# Patient Record
Sex: FEMALE | Race: OTHER | NOT HISPANIC OR LATINO
[De-identification: names, ages, dates, MRNs, and addresses within clinical notes are randomized per-mention and may not be internally consistent; named-entity substitution may affect disease eponyms.]

---

## 2017-07-06 ENCOUNTER — MESSAGE (OUTPATIENT)
Age: 82
End: 2017-07-06

## 2017-07-31 ENCOUNTER — APPOINTMENT (OUTPATIENT)
Dept: NEUROLOGY | Facility: CLINIC | Age: 82
End: 2017-07-31

## 2017-08-28 ENCOUNTER — APPOINTMENT (OUTPATIENT)
Dept: GERIATRICS | Facility: CLINIC | Age: 82
End: 2017-08-28
Payer: MEDICARE

## 2017-08-28 VITALS
OXYGEN SATURATION: 98 % | HEART RATE: 78 BPM | WEIGHT: 118 LBS | HEIGHT: 62.4 IN | RESPIRATION RATE: 20 BRPM | BODY MASS INDEX: 21.44 KG/M2 | SYSTOLIC BLOOD PRESSURE: 140 MMHG | DIASTOLIC BLOOD PRESSURE: 70 MMHG

## 2017-08-28 DIAGNOSIS — J45.909 UNSPECIFIED ASTHMA, UNCOMPLICATED: ICD-10-CM

## 2017-08-28 DIAGNOSIS — K73.9 CHRONIC HEPATITIS, UNSPECIFIED: ICD-10-CM

## 2017-08-28 DIAGNOSIS — K57.32 DIVERTICULITIS OF LARGE INTESTINE W/OUT PERFORATION OR ABSCESS W/OUT BLEEDING: ICD-10-CM

## 2017-08-28 DIAGNOSIS — I10 ESSENTIAL (PRIMARY) HYPERTENSION: ICD-10-CM

## 2017-08-28 DIAGNOSIS — Z00.00 ENCOUNTER FOR GENERAL ADULT MEDICAL EXAMINATION W/OUT ABNORMAL FINDINGS: ICD-10-CM

## 2017-08-28 DIAGNOSIS — E03.9 HYPOTHYROIDISM, UNSPECIFIED: ICD-10-CM

## 2017-08-28 DIAGNOSIS — D12.6 BENIGN NEOPLASM OF COLON, UNSPECIFIED: ICD-10-CM

## 2017-08-28 DIAGNOSIS — E11.9 TYPE 2 DIABETES MELLITUS W/OUT COMPLICATIONS: ICD-10-CM

## 2017-08-28 DIAGNOSIS — E78.5 HYPERLIPIDEMIA, UNSPECIFIED: ICD-10-CM

## 2017-08-28 DIAGNOSIS — R41.3 OTHER AMNESIA: ICD-10-CM

## 2017-08-28 PROCEDURE — 99205 OFFICE O/P NEW HI 60 MIN: CPT | Mod: GC

## 2017-08-29 LAB
ALBUMIN SERPL ELPH-MCNC: 4 G/DL
ALP BLD-CCNC: 189 U/L
ALT SERPL-CCNC: 17 U/L
ANION GAP SERPL CALC-SCNC: 15 MMOL/L
AST SERPL-CCNC: 28 U/L
BASOPHILS # BLD AUTO: 0.04 K/UL
BASOPHILS NFR BLD AUTO: 0.9 %
BILIRUB SERPL-MCNC: 0.4 MG/DL
BUN SERPL-MCNC: 20 MG/DL
CALCIUM SERPL-MCNC: 9.1 MG/DL
CHLORIDE SERPL-SCNC: 104 MMOL/L
CHOLEST SERPL-MCNC: 272 MG/DL
CHOLEST/HDLC SERPL: 3 RATIO
CO2 SERPL-SCNC: 23 MMOL/L
CREAT SERPL-MCNC: 1.01 MG/DL
EOSINOPHIL # BLD AUTO: 0.03 K/UL
EOSINOPHIL NFR BLD AUTO: 0.7 %
GLUCOSE SERPL-MCNC: 162 MG/DL
HBA1C MFR BLD HPLC: 5.8 %
HCT VFR BLD CALC: 39.2 %
HDLC SERPL-MCNC: 91 MG/DL
HGB BLD-MCNC: 12.5 G/DL
IMM GRANULOCYTES NFR BLD AUTO: 0 %
LDLC SERPL CALC-MCNC: 165 MG/DL
LYMPHOCYTES # BLD AUTO: 1.45 K/UL
LYMPHOCYTES NFR BLD AUTO: 33.2 %
MAN DIFF?: NORMAL
MCHC RBC-ENTMCNC: 28.9 PG
MCHC RBC-ENTMCNC: 31.9 GM/DL
MCV RBC AUTO: 90.5 FL
MONOCYTES # BLD AUTO: 0.54 K/UL
MONOCYTES NFR BLD AUTO: 12.4 %
NEUTROPHILS # BLD AUTO: 2.31 K/UL
NEUTROPHILS NFR BLD AUTO: 52.8 %
PLATELET # BLD AUTO: 180 K/UL
POTASSIUM SERPL-SCNC: 4.2 MMOL/L
PROT SERPL-MCNC: 7.3 G/DL
RBC # BLD: 4.33 M/UL
RBC # FLD: 13.1 %
SODIUM SERPL-SCNC: 142 MMOL/L
TRIGL SERPL-MCNC: 78 MG/DL
TSH SERPL-ACNC: 5.86 UIU/ML
WBC # FLD AUTO: 4.37 K/UL

## 2017-08-31 ENCOUNTER — INPATIENT (INPATIENT)
Facility: HOSPITAL | Age: 82
LOS: 4 days | DRG: 283 | End: 2017-09-05
Attending: HOSPITALIST | Admitting: HOSPITALIST
Payer: MEDICARE

## 2017-08-31 VITALS
DIASTOLIC BLOOD PRESSURE: 113 MMHG | SYSTOLIC BLOOD PRESSURE: 134 MMHG | RESPIRATION RATE: 20 BRPM | OXYGEN SATURATION: 95 % | HEART RATE: 112 BPM

## 2017-08-31 DIAGNOSIS — E11.9 TYPE 2 DIABETES MELLITUS WITHOUT COMPLICATIONS: ICD-10-CM

## 2017-08-31 DIAGNOSIS — I21.3 ST ELEVATION (STEMI) MYOCARDIAL INFARCTION OF UNSPECIFIED SITE: ICD-10-CM

## 2017-08-31 DIAGNOSIS — Z90.710 ACQUIRED ABSENCE OF BOTH CERVIX AND UTERUS: Chronic | ICD-10-CM

## 2017-08-31 DIAGNOSIS — Z29.9 ENCOUNTER FOR PROPHYLACTIC MEASURES, UNSPECIFIED: ICD-10-CM

## 2017-08-31 DIAGNOSIS — Z90.89 ACQUIRED ABSENCE OF OTHER ORGANS: Chronic | ICD-10-CM

## 2017-08-31 DIAGNOSIS — I10 ESSENTIAL (PRIMARY) HYPERTENSION: ICD-10-CM

## 2017-08-31 DIAGNOSIS — E03.9 HYPOTHYROIDISM, UNSPECIFIED: ICD-10-CM

## 2017-08-31 LAB
ALBUMIN SERPL ELPH-MCNC: 3.8 G/DL — SIGNIFICANT CHANGE UP (ref 3.3–5)
ALBUMIN SERPL ELPH-MCNC: 4.2 G/DL — SIGNIFICANT CHANGE UP (ref 3.3–5)
ALP SERPL-CCNC: 181 U/L — HIGH (ref 40–120)
ALP SERPL-CCNC: 189 U/L — HIGH (ref 40–120)
ALT FLD-CCNC: 31 U/L RC — SIGNIFICANT CHANGE UP (ref 10–45)
ALT FLD-CCNC: 31 U/L RC — SIGNIFICANT CHANGE UP (ref 10–45)
ANION GAP SERPL CALC-SCNC: 16 MMOL/L — SIGNIFICANT CHANGE UP (ref 5–17)
ANION GAP SERPL CALC-SCNC: 16 MMOL/L — SIGNIFICANT CHANGE UP (ref 5–17)
APTT BLD: 28.5 SEC — SIGNIFICANT CHANGE UP (ref 27.5–37.4)
APTT BLD: 48.8 SEC — HIGH (ref 27.5–37.4)
APTT BLD: 84 SEC — HIGH (ref 27.5–37.4)
AST SERPL-CCNC: 149 U/L — HIGH (ref 10–40)
AST SERPL-CCNC: 67 U/L — HIGH (ref 10–40)
BASE EXCESS BLDV CALC-SCNC: 3.1 MMOL/L — HIGH (ref -2–2)
BASOPHILS # BLD AUTO: 0 K/UL — SIGNIFICANT CHANGE UP (ref 0–0.2)
BASOPHILS # BLD AUTO: 0.1 K/UL — SIGNIFICANT CHANGE UP (ref 0–0.2)
BASOPHILS NFR BLD AUTO: 0.2 % — SIGNIFICANT CHANGE UP (ref 0–2)
BASOPHILS NFR BLD AUTO: 0.7 % — SIGNIFICANT CHANGE UP (ref 0–2)
BILIRUB SERPL-MCNC: 0.8 MG/DL — SIGNIFICANT CHANGE UP (ref 0.2–1.2)
BILIRUB SERPL-MCNC: 1 MG/DL — SIGNIFICANT CHANGE UP (ref 0.2–1.2)
BLD GP AB SCN SERPL QL: NEGATIVE — SIGNIFICANT CHANGE UP
BUN SERPL-MCNC: 19 MG/DL — SIGNIFICANT CHANGE UP (ref 7–23)
BUN SERPL-MCNC: 22 MG/DL — SIGNIFICANT CHANGE UP (ref 7–23)
CA-I SERPL-SCNC: 1.17 MMOL/L — SIGNIFICANT CHANGE UP (ref 1.12–1.3)
CALCIUM SERPL-MCNC: 9.2 MG/DL — SIGNIFICANT CHANGE UP (ref 8.4–10.5)
CALCIUM SERPL-MCNC: 9.5 MG/DL — SIGNIFICANT CHANGE UP (ref 8.4–10.5)
CHLORIDE BLDV-SCNC: 100 MMOL/L — SIGNIFICANT CHANGE UP (ref 96–108)
CHLORIDE SERPL-SCNC: 101 MMOL/L — SIGNIFICANT CHANGE UP (ref 96–108)
CHLORIDE SERPL-SCNC: 97 MMOL/L — SIGNIFICANT CHANGE UP (ref 96–108)
CHOLEST SERPL-MCNC: 227 MG/DL — HIGH (ref 10–199)
CK MB BLD-MCNC: 13 % — HIGH (ref 0–3.5)
CK MB BLD-MCNC: 4.8 % — HIGH (ref 0–3.5)
CK MB BLD-MCNC: 8.3 % — HIGH (ref 0–3.5)
CK MB CFR SERPL CALC: 132.9 NG/ML — HIGH (ref 0–3.8)
CK MB CFR SERPL CALC: 273.2 NG/ML — HIGH (ref 0–3.8)
CK MB CFR SERPL CALC: 279.1 NG/ML — HIGH (ref 0–3.8)
CK MB CFR SERPL CALC: 35.9 NG/ML — HIGH (ref 0–3.8)
CK SERPL-CCNC: 2103 U/L — HIGH (ref 25–170)
CK SERPL-CCNC: 2788 U/L — HIGH (ref 25–170)
CK SERPL-CCNC: 432 U/L — HIGH (ref 25–170)
CO2 BLDV-SCNC: 31 MMOL/L — HIGH (ref 22–30)
CO2 SERPL-SCNC: 23 MMOL/L — SIGNIFICANT CHANGE UP (ref 22–31)
CO2 SERPL-SCNC: 27 MMOL/L — SIGNIFICANT CHANGE UP (ref 22–31)
CREAT SERPL-MCNC: 0.87 MG/DL — SIGNIFICANT CHANGE UP (ref 0.5–1.3)
CREAT SERPL-MCNC: 1.06 MG/DL — SIGNIFICANT CHANGE UP (ref 0.5–1.3)
EOSINOPHIL # BLD AUTO: 0 K/UL — SIGNIFICANT CHANGE UP (ref 0–0.5)
EOSINOPHIL # BLD AUTO: 0 K/UL — SIGNIFICANT CHANGE UP (ref 0–0.5)
EOSINOPHIL NFR BLD AUTO: 0.2 % — SIGNIFICANT CHANGE UP (ref 0–6)
EOSINOPHIL NFR BLD AUTO: 0.3 % — SIGNIFICANT CHANGE UP (ref 0–6)
GAS PNL BLDV: 140 MMOL/L — SIGNIFICANT CHANGE UP (ref 136–145)
GAS PNL BLDV: SIGNIFICANT CHANGE UP
GAS PNL BLDV: SIGNIFICANT CHANGE UP
GLUCOSE BLDV-MCNC: 193 MG/DL — HIGH (ref 70–99)
GLUCOSE SERPL-MCNC: 160 MG/DL — HIGH (ref 70–99)
GLUCOSE SERPL-MCNC: 203 MG/DL — HIGH (ref 70–99)
HBA1C BLD-MCNC: 5.6 % — SIGNIFICANT CHANGE UP (ref 4–5.6)
HCO3 BLDV-SCNC: 30 MMOL/L — HIGH (ref 21–29)
HCT VFR BLD CALC: 40.7 % — SIGNIFICANT CHANGE UP (ref 34.5–45)
HCT VFR BLD CALC: 42.5 % — SIGNIFICANT CHANGE UP (ref 34.5–45)
HCT VFR BLD CALC: 43.8 % — SIGNIFICANT CHANGE UP (ref 34.5–45)
HCT VFR BLDA CALC: 44 % — SIGNIFICANT CHANGE UP (ref 39–50)
HDLC SERPL-MCNC: 97 MG/DL — SIGNIFICANT CHANGE UP (ref 40–125)
HGB BLD CALC-MCNC: 14.3 G/DL — SIGNIFICANT CHANGE UP (ref 11.5–15.5)
HGB BLD-MCNC: 13.1 G/DL — SIGNIFICANT CHANGE UP (ref 11.5–15.5)
HGB BLD-MCNC: 14.2 G/DL — SIGNIFICANT CHANGE UP (ref 11.5–15.5)
HGB BLD-MCNC: 14.3 G/DL — SIGNIFICANT CHANGE UP (ref 11.5–15.5)
HOROWITZ INDEX BLDV+IHG-RTO: SIGNIFICANT CHANGE UP
INR BLD: 1.07 RATIO — SIGNIFICANT CHANGE UP (ref 0.88–1.16)
INR BLD: 1.13 RATIO — SIGNIFICANT CHANGE UP (ref 0.88–1.16)
INR BLD: 1.14 RATIO — SIGNIFICANT CHANGE UP (ref 0.88–1.16)
LACTATE BLDV-MCNC: 2.6 MMOL/L — HIGH (ref 0.7–2)
LACTATE BLDV-MCNC: 4.3 MMOL/L — CRITICAL HIGH (ref 0.7–2)
LIDOCAIN IGE QN: 56 U/L — SIGNIFICANT CHANGE UP (ref 7–60)
LIPID PNL WITH DIRECT LDL SERPL: 121 MG/DL — SIGNIFICANT CHANGE UP
LYMPHOCYTES # BLD AUTO: 0.8 K/UL — LOW (ref 1–3.3)
LYMPHOCYTES # BLD AUTO: 0.9 K/UL — LOW (ref 1–3.3)
LYMPHOCYTES # BLD AUTO: 11.6 % — LOW (ref 13–44)
LYMPHOCYTES # BLD AUTO: 11.7 % — LOW (ref 13–44)
MAGNESIUM SERPL-MCNC: 1.8 MG/DL — SIGNIFICANT CHANGE UP (ref 1.6–2.6)
MAGNESIUM SERPL-MCNC: 2 MG/DL — SIGNIFICANT CHANGE UP (ref 1.6–2.6)
MCHC RBC-ENTMCNC: 30.2 PG — SIGNIFICANT CHANGE UP (ref 27–34)
MCHC RBC-ENTMCNC: 30.9 PG — SIGNIFICANT CHANGE UP (ref 27–34)
MCHC RBC-ENTMCNC: 31.4 PG — SIGNIFICANT CHANGE UP (ref 27–34)
MCHC RBC-ENTMCNC: 32.3 GM/DL — SIGNIFICANT CHANGE UP (ref 32–36)
MCHC RBC-ENTMCNC: 32.7 GM/DL — SIGNIFICANT CHANGE UP (ref 32–36)
MCHC RBC-ENTMCNC: 33.4 GM/DL — SIGNIFICANT CHANGE UP (ref 32–36)
MCV RBC AUTO: 93.4 FL — SIGNIFICANT CHANGE UP (ref 80–100)
MCV RBC AUTO: 94.2 FL — SIGNIFICANT CHANGE UP (ref 80–100)
MCV RBC AUTO: 94.6 FL — SIGNIFICANT CHANGE UP (ref 80–100)
MONOCYTES # BLD AUTO: 0.4 K/UL — SIGNIFICANT CHANGE UP (ref 0–0.9)
MONOCYTES # BLD AUTO: 0.4 K/UL — SIGNIFICANT CHANGE UP (ref 0–0.9)
MONOCYTES NFR BLD AUTO: 5.6 % — SIGNIFICANT CHANGE UP (ref 2–14)
MONOCYTES NFR BLD AUTO: 5.6 % — SIGNIFICANT CHANGE UP (ref 2–14)
NEUTROPHILS # BLD AUTO: 5.7 K/UL — SIGNIFICANT CHANGE UP (ref 1.8–7.4)
NEUTROPHILS # BLD AUTO: 6.5 K/UL — SIGNIFICANT CHANGE UP (ref 1.8–7.4)
NEUTROPHILS NFR BLD AUTO: 81.6 % — HIGH (ref 43–77)
NEUTROPHILS NFR BLD AUTO: 82.3 % — HIGH (ref 43–77)
NT-PROBNP SERPL-SCNC: 706 PG/ML — HIGH (ref 0–300)
PCO2 BLDV: 55 MMHG — HIGH (ref 35–50)
PH BLDV: 7.35 — SIGNIFICANT CHANGE UP (ref 7.35–7.45)
PHOSPHATE SERPL-MCNC: 3.2 MG/DL — SIGNIFICANT CHANGE UP (ref 2.5–4.5)
PHOSPHATE SERPL-MCNC: 3.7 MG/DL — SIGNIFICANT CHANGE UP (ref 2.5–4.5)
PLATELET # BLD AUTO: 145 K/UL — LOW (ref 150–400)
PLATELET # BLD AUTO: 159 K/UL — SIGNIFICANT CHANGE UP (ref 150–400)
PLATELET # BLD AUTO: 166 K/UL — SIGNIFICANT CHANGE UP (ref 150–400)
PO2 BLDV: 18 MMHG — LOW (ref 25–45)
POTASSIUM BLDV-SCNC: 5.1 MMOL/L — HIGH (ref 3.5–5)
POTASSIUM SERPL-MCNC: 4.1 MMOL/L — SIGNIFICANT CHANGE UP (ref 3.5–5.3)
POTASSIUM SERPL-MCNC: 5.2 MMOL/L — SIGNIFICANT CHANGE UP (ref 3.5–5.3)
POTASSIUM SERPL-SCNC: 4.1 MMOL/L — SIGNIFICANT CHANGE UP (ref 3.5–5.3)
POTASSIUM SERPL-SCNC: 5.2 MMOL/L — SIGNIFICANT CHANGE UP (ref 3.5–5.3)
PROT SERPL-MCNC: 7.6 G/DL — SIGNIFICANT CHANGE UP (ref 6–8.3)
PROT SERPL-MCNC: 8.4 G/DL — HIGH (ref 6–8.3)
PROTHROM AB SERPL-ACNC: 11.7 SEC — SIGNIFICANT CHANGE UP (ref 9.8–12.7)
PROTHROM AB SERPL-ACNC: 12.2 SEC — SIGNIFICANT CHANGE UP (ref 9.8–12.7)
PROTHROM AB SERPL-ACNC: 12.5 SEC — SIGNIFICANT CHANGE UP (ref 9.8–12.7)
RBC # BLD: 4.35 M/UL — SIGNIFICANT CHANGE UP (ref 3.8–5.2)
RBC # BLD: 4.51 M/UL — SIGNIFICANT CHANGE UP (ref 3.8–5.2)
RBC # BLD: 4.63 M/UL — SIGNIFICANT CHANGE UP (ref 3.8–5.2)
RBC # FLD: 11.6 % — SIGNIFICANT CHANGE UP (ref 10.3–14.5)
RBC # FLD: 11.7 % — SIGNIFICANT CHANGE UP (ref 10.3–14.5)
RBC # FLD: 11.8 % — SIGNIFICANT CHANGE UP (ref 10.3–14.5)
RH IG SCN BLD-IMP: POSITIVE — SIGNIFICANT CHANGE UP
SAO2 % BLDV: 16 % — LOW (ref 67–88)
SODIUM SERPL-SCNC: 140 MMOL/L — SIGNIFICANT CHANGE UP (ref 135–145)
SODIUM SERPL-SCNC: 140 MMOL/L — SIGNIFICANT CHANGE UP (ref 135–145)
TOTAL CHOLESTEROL/HDL RATIO MEASUREMENT: 2.3 RATIO — LOW (ref 3.3–7.1)
TRIGL SERPL-MCNC: 46 MG/DL — SIGNIFICANT CHANGE UP (ref 10–149)
TROPONIN T SERPL-MCNC: 0.45 NG/ML — HIGH (ref 0–0.06)
TROPONIN T SERPL-MCNC: 3.62 NG/ML — HIGH (ref 0–0.06)
TROPONIN T SERPL-MCNC: 6.36 NG/ML — HIGH (ref 0–0.06)
TROPONIN T SERPL-MCNC: 7.45 NG/ML — HIGH (ref 0–0.06)
TSH SERPL-MCNC: 3.72 UIU/ML — SIGNIFICANT CHANGE UP (ref 0.27–4.2)
WBC # BLD: 10.3 K/UL — SIGNIFICANT CHANGE UP (ref 3.8–10.5)
WBC # BLD: 7 K/UL — SIGNIFICANT CHANGE UP (ref 3.8–10.5)
WBC # BLD: 7.9 K/UL — SIGNIFICANT CHANGE UP (ref 3.8–10.5)
WBC # FLD AUTO: 10.3 K/UL — SIGNIFICANT CHANGE UP (ref 3.8–10.5)
WBC # FLD AUTO: 7 K/UL — SIGNIFICANT CHANGE UP (ref 3.8–10.5)
WBC # FLD AUTO: 7.9 K/UL — SIGNIFICANT CHANGE UP (ref 3.8–10.5)

## 2017-08-31 PROCEDURE — 93306 TTE W/DOPPLER COMPLETE: CPT | Mod: 26

## 2017-08-31 PROCEDURE — 99233 SBSQ HOSP IP/OBS HIGH 50: CPT

## 2017-08-31 PROCEDURE — 71010: CPT | Mod: 26

## 2017-08-31 PROCEDURE — 93010 ELECTROCARDIOGRAM REPORT: CPT | Mod: 76

## 2017-08-31 PROCEDURE — 93308 TTE F-UP OR LMTD: CPT | Mod: 26

## 2017-08-31 PROCEDURE — 99291 CRITICAL CARE FIRST HOUR: CPT | Mod: GC

## 2017-08-31 PROCEDURE — 70450 CT HEAD/BRAIN W/O DYE: CPT | Mod: 26

## 2017-08-31 PROCEDURE — 93010 ELECTROCARDIOGRAM REPORT: CPT

## 2017-08-31 RX ORDER — FUROSEMIDE 40 MG
40 TABLET ORAL ONCE
Qty: 0 | Refills: 0 | Status: DISCONTINUED | OUTPATIENT
Start: 2017-08-31 | End: 2017-08-31

## 2017-08-31 RX ORDER — ASPIRIN/CALCIUM CARB/MAGNESIUM 324 MG
81 TABLET ORAL DAILY
Qty: 0 | Refills: 0 | Status: DISCONTINUED | OUTPATIENT
Start: 2017-08-31 | End: 2017-09-05

## 2017-08-31 RX ORDER — HEPARIN SODIUM 5000 [USP'U]/ML
5000 INJECTION INTRAVENOUS; SUBCUTANEOUS ONCE
Qty: 0 | Refills: 0 | Status: COMPLETED | OUTPATIENT
Start: 2017-08-31 | End: 2017-08-31

## 2017-08-31 RX ORDER — LISINOPRIL 2.5 MG/1
2.5 TABLET ORAL DAILY
Qty: 0 | Refills: 0 | Status: DISCONTINUED | OUTPATIENT
Start: 2017-08-31 | End: 2017-09-02

## 2017-08-31 RX ORDER — FUROSEMIDE 40 MG
20 TABLET ORAL ONCE
Qty: 0 | Refills: 0 | Status: COMPLETED | OUTPATIENT
Start: 2017-08-31 | End: 2017-08-31

## 2017-08-31 RX ORDER — CLOPIDOGREL BISULFATE 75 MG/1
75 TABLET, FILM COATED ORAL DAILY
Qty: 0 | Refills: 0 | Status: DISCONTINUED | OUTPATIENT
Start: 2017-08-31 | End: 2017-09-05

## 2017-08-31 RX ORDER — HEPARIN SODIUM 5000 [USP'U]/ML
INJECTION INTRAVENOUS; SUBCUTANEOUS
Qty: 25000 | Refills: 0 | Status: DISCONTINUED | OUTPATIENT
Start: 2017-08-31 | End: 2017-08-31

## 2017-08-31 RX ORDER — NITROGLYCERIN 6.5 MG
0.4 CAPSULE, EXTENDED RELEASE ORAL ONCE
Qty: 0 | Refills: 0 | Status: COMPLETED | OUTPATIENT
Start: 2017-08-31 | End: 2017-08-31

## 2017-08-31 RX ORDER — HEPARIN SODIUM 5000 [USP'U]/ML
5000 INJECTION INTRAVENOUS; SUBCUTANEOUS EVERY 8 HOURS
Qty: 0 | Refills: 0 | Status: DISCONTINUED | OUTPATIENT
Start: 2017-08-31 | End: 2017-09-01

## 2017-08-31 RX ORDER — MAGNESIUM SULFATE 500 MG/ML
2 VIAL (ML) INJECTION ONCE
Qty: 0 | Refills: 0 | Status: COMPLETED | OUTPATIENT
Start: 2017-08-31 | End: 2017-08-31

## 2017-08-31 RX ORDER — HEPARIN SODIUM 5000 [USP'U]/ML
3200 INJECTION INTRAVENOUS; SUBCUTANEOUS EVERY 6 HOURS
Qty: 0 | Refills: 0 | Status: DISCONTINUED | OUTPATIENT
Start: 2017-08-31 | End: 2017-08-31

## 2017-08-31 RX ORDER — METOPROLOL TARTRATE 50 MG
50 TABLET ORAL ONCE
Qty: 0 | Refills: 0 | Status: COMPLETED | OUTPATIENT
Start: 2017-08-31 | End: 2017-08-31

## 2017-08-31 RX ORDER — METOPROLOL TARTRATE 50 MG
12.5 TABLET ORAL EVERY 12 HOURS
Qty: 0 | Refills: 0 | Status: DISCONTINUED | OUTPATIENT
Start: 2017-08-31 | End: 2017-09-04

## 2017-08-31 RX ORDER — ATORVASTATIN CALCIUM 80 MG/1
40 TABLET, FILM COATED ORAL AT BEDTIME
Qty: 0 | Refills: 0 | Status: DISCONTINUED | OUTPATIENT
Start: 2017-08-31 | End: 2017-09-05

## 2017-08-31 RX ORDER — ASPIRIN/CALCIUM CARB/MAGNESIUM 324 MG
324 TABLET ORAL DAILY
Qty: 0 | Refills: 0 | Status: DISCONTINUED | OUTPATIENT
Start: 2017-08-31 | End: 2017-08-31

## 2017-08-31 RX ORDER — TICAGRELOR 90 MG/1
90 TABLET ORAL
Qty: 0 | Refills: 0 | Status: DISCONTINUED | OUTPATIENT
Start: 2017-08-31 | End: 2017-08-31

## 2017-08-31 RX ORDER — SODIUM CHLORIDE 9 MG/ML
3 INJECTION INTRAMUSCULAR; INTRAVENOUS; SUBCUTANEOUS ONCE
Qty: 0 | Refills: 0 | Status: COMPLETED | OUTPATIENT
Start: 2017-08-31 | End: 2017-08-31

## 2017-08-31 RX ORDER — TICAGRELOR 90 MG/1
180 TABLET ORAL ONCE
Qty: 0 | Refills: 0 | Status: COMPLETED | OUTPATIENT
Start: 2017-08-31 | End: 2017-08-31

## 2017-08-31 RX ORDER — ATORVASTATIN CALCIUM 80 MG/1
80 TABLET, FILM COATED ORAL AT BEDTIME
Qty: 0 | Refills: 0 | Status: DISCONTINUED | OUTPATIENT
Start: 2017-08-31 | End: 2017-08-31

## 2017-08-31 RX ORDER — COLESEVELAM HYDROCHLORIDE 625 MG/1
1 TABLET, FILM COATED ORAL
Qty: 0 | Refills: 0 | COMMUNITY

## 2017-08-31 RX ORDER — LEVOTHYROXINE SODIUM 125 MCG
25 TABLET ORAL DAILY
Qty: 0 | Refills: 0 | Status: DISCONTINUED | OUTPATIENT
Start: 2017-08-31 | End: 2017-09-04

## 2017-08-31 RX ORDER — ASPIRIN/CALCIUM CARB/MAGNESIUM 324 MG
325 TABLET ORAL ONCE
Qty: 0 | Refills: 0 | Status: DISCONTINUED | OUTPATIENT
Start: 2017-08-31 | End: 2017-08-31

## 2017-08-31 RX ADMIN — Medication 81 MILLIGRAM(S): at 12:01

## 2017-08-31 RX ADMIN — Medication 12.5 MILLIGRAM(S): at 17:32

## 2017-08-31 RX ADMIN — CLOPIDOGREL BISULFATE 75 MILLIGRAM(S): 75 TABLET, FILM COATED ORAL at 13:18

## 2017-08-31 RX ADMIN — Medication 50 GRAM(S): at 06:41

## 2017-08-31 RX ADMIN — HEPARIN SODIUM 5000 UNIT(S): 5000 INJECTION INTRAVENOUS; SUBCUTANEOUS at 02:34

## 2017-08-31 RX ADMIN — Medication 25 MICROGRAM(S): at 21:13

## 2017-08-31 RX ADMIN — ATORVASTATIN CALCIUM 40 MILLIGRAM(S): 80 TABLET, FILM COATED ORAL at 21:14

## 2017-08-31 RX ADMIN — Medication 324 MILLIGRAM(S): at 02:32

## 2017-08-31 RX ADMIN — TICAGRELOR 180 MILLIGRAM(S): 90 TABLET ORAL at 02:33

## 2017-08-31 RX ADMIN — Medication 20 MILLIGRAM(S): at 06:12

## 2017-08-31 RX ADMIN — SODIUM CHLORIDE 3 MILLILITER(S): 9 INJECTION INTRAMUSCULAR; INTRAVENOUS; SUBCUTANEOUS at 02:15

## 2017-08-31 RX ADMIN — Medication 50 MILLIGRAM(S): at 04:10

## 2017-08-31 RX ADMIN — HEPARIN SODIUM 650 UNIT(S)/HR: 5000 INJECTION INTRAVENOUS; SUBCUTANEOUS at 06:12

## 2017-08-31 NOTE — H&P ADULT - PROBLEM SELECTOR PLAN 2
- Patient has signs of HF- JVD, crackles on exam. Chest x-ray significant for pulmonary edema  - Will treat with gentle lasix dose. 20mg IV  - Monitor In & Out.  - BB & Ace-I when BP tolerates

## 2017-08-31 NOTE — CHART NOTE - NSCHARTNOTEFT_GEN_A_CORE
CCU Transfer Note    Transfer from: CCU    Transfer to:    ( x ) Telemetry         Accepting Physician:    Signout given to:     CCU COURSE:  Dx: NSTEMI/ADHF  80 yo HTN, HLD, DM, pleasantly confused p/w CP, q waves and LYNDA precordial leads,  signs of HF, loaded w/ ASA/brilinta, hep started in ED, BB given, also given 500 cc for hypotension and admitted to CCU. w/ NSVT in CCU x 3. Not candidate for Cath/PCI due to falls and dementia history will medical management     PAST MEDICAL & SURGICAL HISTORY:  Diabetes  Hypothyroidism  HTN (hypertension)  S/P tonsillectomy  H/O abdominal hysterectomy      Vital Signs Last 24 Hrs  T(C): 36.8 (31 Aug 2017 16:00), Max: 36.8 (31 Aug 2017 02:12)  T(F): 98.3 (31 Aug 2017 16:00), Max: 98.3 (31 Aug 2017 16:00)  HR: 96 (31 Aug 2017 17:00) (94 - 112)  BP: 110/74 (31 Aug 2017 17:00) (81/72 - 134/113)  BP(mean): 84 (31 Aug 2017 17:00) (67 - 84)  RR: 20 (31 Aug 2017 17:00) (14 - 32)  SpO2: 100% (31 Aug 2017 17:00) (95% - 100%)  I&O's Summary    30 Aug 2017 07:  -  31 Aug 2017 07:00  --------------------------------------------------------  IN: 56.5 mL / OUT: 0 mL / NET: 56.5 mL    31 Aug 2017 07:  -  31 Aug 2017 17:10  --------------------------------------------------------  IN: 0 mL / OUT: 1100 mL / NET: -1100 mL  REVIEW OF SYSTEMS:    CONSTITUTIONAL: No weakness, fevers or chills  EYES/ENT: No visual changes;  No vertigo or throat pain   NECK: No pain or stiffness  RESPIRATORY: No cough, wheezing, hemoptysis; No shortness of breath  CARDIOVASCULAR: No chest pain or palpitations  GASTROINTESTINAL: No abdominal or epigastric pain. No nausea, vomiting, or hematemesis; No diarrhea or constipation. No melena or hematochezia.  GENITOURINARY: No dysuria, frequency or hematuria  NEUROLOGICAL: No numbness or weakness  SKIN: No itching, rashes    General: WN/WD NAD  Neurology: A&Ox2, MMS severe dementia, nonfocal, PEREIRA x 4  Respiratory: CTA B/L  CV: RRR, S1S2, no murmurs, rubs or gallops  Abdominal: Soft, NT, ND +BS, Last BM  Extremities: No edema, + peripheral pulses    Allergies    streptomycin (Unknown)    Intolerances      MEDICATIONS  (STANDING):  aspirin enteric coated 81 milliGRAM(s) Oral daily  levothyroxine 25 MICROGram(s) Oral daily  atorvastatin 40 milliGRAM(s) Oral at bedtime  clopidogrel Tablet 75 milliGRAM(s) Oral daily  metoprolol 12.5 milliGRAM(s) Oral every 12 hours    CARDIAC MARKERS ( 31 Aug 2017 11:42 )  x     / 7.45 ng/mL / x     / x     / 279.1 ng/mL  CARDIAC MARKERS ( 31 Aug 2017 06:06 )  x     / 3.62 ng/mL / 2103 U/L / x     / 273.2 ng/mL  CARDIAC MARKERS ( 31 Aug 2017 02:47 )  x     / 0.45 ng/mL / 432 U/L / x     / 35.9 ng/mL                            13.1   10.3  )-----------( 159      ( 31 Aug 2017 11:42 )             40.7         140  |  101  |  19  ----------------------------<  160<H>  4.1   |  23  |  0.87    Ca    9.2      31 Aug 2017 06:06  Phos  3.2       Mg     1.8         TPro  7.6  /  Alb  3.8  /  TBili  1.0  /  DBili  x   /  AST  149<H>  /  ALT  31  /  AlkPhos  181<H>      PT/INR - ( 31 Aug 2017 11:42 )   PT: 12.2 sec;   INR: 1.13 ratio         PTT - ( 31 Aug 2017 11:42 )  PTT:48.8 sec  PHYSICAL EXAM:    EKG: ST elevation in V3-V5 with q waves in V4 & V5. Left axis deviation, Left anterior fascicular block    Telemetry: PVCs  Echo:  TTE: EF 40%,  Minimal AR, Nml RV, Trace pericardial effusion, mid anterior  septum,  akinesis of the apex and septum,   may need contrast to evaluate for possibe LV thromubus    Imagin17 Head CT  MPRESSION:   In comparison with prior MRI, there is unchanged volume loss and   microvascular disease without obvious acute hemorrhage or midline shift.      ASSESSMENT & PLAN:   · Assessment	  82 F htn, dm2 (Diet Controlled), hypothyroidism, mild dementia, p/w L sided chest pain concerning for Late Presentation MI vs. NSTEMI    Problem/Plan - 1:  ·  Problem: R/O NSTEMI (non-ST elevated myocardial infarction).  Plan: - Concern for NSTEMI as patient has q waves in V4 & V5 with positive troponin  - s/p ASA & Brilinta load. Hep bolus  - C/w ASA 81mg QD and changed to Plavix daily  - Bedside TTE moderatly reduced left ventricular systolic function  with segmental wall motion abnormalaties.  The mid anterior  septum, the apical inferior wall, the apical anterior wall,  the apical septum, the apical lateral wall, the mid  anterior wall, and the mid inferoseptum are akinetic.  - Plan for medical management; not a candidate for Cath due to dementia and falls    Problem/Plan - 2:  ·  Problem: R/O Acute heart failure, unspecified heart failure type.  Plan: - Patient has signs of HF- JVD, crackles on exam. Chest x-ray significant for pulmonary edema  - Will treat with gentle lasix dose. 20mg IV  - Monitor In & Out.  - Added low dose betablocker and will add Ace-I when BP tolerates.     Problem/Plan - 3:  ·  Problem: Diabetes.  Plan: - Patient is diet controlled. WDL  A1c 5.6 preDiabetic    Problem/Plan - 4:  ·  Problem: HTN (hypertension).  Plan: - Hold home antihypertensive as patient BP soft. Will consider adding ACE-I in AM    Problem/Plan - 5:  ·  Problem: Hypothyroidism.  Plan: c/w Synthroid 25mcg. Check TSH and follow results.    Problem/Plan - 6:  Problem: Prophylactic measure. Plan: DVT prophylaxis; DASH diet      FOR FOLLOW UP: Cardiology Consult Team CCU Transfer Note    Transfer from: CCU    Transfer to:    ( x ) Telemetry         Accepting Physician:    Signout given to:     CCU COURSE:  Dx: NSTEMI/ADHF  80 yo HTN, HLD, DM, pleasantly confused p/w CP, q waves and LYNAD precordial leads,  signs of HF, loaded w/ ASA/brilinta, hep started in ED, BB given, also given 500 cc for hypotension and admitted to CCU. w/ NSVT in CCU x 3. Not candidate for Cath/PCI due to falls and dementia history will medical management     PAST MEDICAL & SURGICAL HISTORY:  Diabetes  Hypothyroidism  HTN (hypertension)  S/P tonsillectomy  H/O abdominal hysterectomy      Vital Signs Last 24 Hrs  T(C): 36.8 (31 Aug 2017 16:00), Max: 36.8 (31 Aug 2017 02:12)  T(F): 98.3 (31 Aug 2017 16:00), Max: 98.3 (31 Aug 2017 16:00)  HR: 96 (31 Aug 2017 17:00) (94 - 112)  BP: 110/74 (31 Aug 2017 17:00) (81/72 - 134/113)  BP(mean): 84 (31 Aug 2017 17:00) (67 - 84)  RR: 20 (31 Aug 2017 17:00) (14 - 32)  SpO2: 100% (31 Aug 2017 17:00) (95% - 100%)  I&O's Summary    30 Aug 2017 07:  -  31 Aug 2017 07:00  --------------------------------------------------------  IN: 56.5 mL / OUT: 0 mL / NET: 56.5 mL    31 Aug 2017 07:  -  31 Aug 2017 17:10  --------------------------------------------------------  IN: 0 mL / OUT: 1100 mL / NET: -1100 mL  REVIEW OF SYSTEMS:    CONSTITUTIONAL: No weakness, fevers or chills  EYES/ENT: No visual changes;  No vertigo or throat pain   NECK: No pain or stiffness  RESPIRATORY: No cough, wheezing, hemoptysis; No shortness of breath  CARDIOVASCULAR: No chest pain or palpitations  GASTROINTESTINAL: No abdominal or epigastric pain. No nausea, vomiting, or hematemesis; No diarrhea or constipation. No melena or hematochezia.  GENITOURINARY: No dysuria, frequency or hematuria  NEUROLOGICAL: No numbness or weakness  SKIN: No itching, rashes    General: WN/WD NAD  Neurology: A&Ox2, MMS severe dementia, nonfocal, PEREIRA x 4  Respiratory: CTA B/L  CV: RRR, S1S2, no murmurs, rubs or gallops  Abdominal: Soft, NT, ND +BS, Last BM  Extremities: No edema, + peripheral pulses    Allergies    streptomycin (Unknown)    Intolerances      MEDICATIONS  (STANDING):  aspirin enteric coated 81 milliGRAM(s) Oral daily  levothyroxine 25 MICROGram(s) Oral daily  atorvastatin 40 milliGRAM(s) Oral at bedtime  clopidogrel Tablet 75 milliGRAM(s) Oral daily  metoprolol 12.5 milliGRAM(s) Oral every 12 hours    CARDIAC MARKERS ( 31 Aug 2017 11:42 )  x     / 7.45 ng/mL / x     / x     / 279.1 ng/mL  CARDIAC MARKERS ( 31 Aug 2017 06:06 )  x     / 3.62 ng/mL / 2103 U/L / x     / 273.2 ng/mL  CARDIAC MARKERS ( 31 Aug 2017 02:47 )  x     / 0.45 ng/mL / 432 U/L / x     / 35.9 ng/mL                            13.1   10.3  )-----------( 159      ( 31 Aug 2017 11:42 )             40.7         140  |  101  |  19  ----------------------------<  160<H>  4.1   |  23  |  0.87    Ca    9.2      31 Aug 2017 06:06  Phos  3.2       Mg     1.8         TPro  7.6  /  Alb  3.8  /  TBili  1.0  /  DBili  x   /  AST  149<H>  /  ALT  31  /  AlkPhos  181<H>      PT/INR - ( 31 Aug 2017 11:42 )   PT: 12.2 sec;   INR: 1.13 ratio         PTT - ( 31 Aug 2017 11:42 )  PTT:48.8 sec  PHYSICAL EXAM:    EKG: ST elevation in V3-V5 with q waves in V4 & V5. Left axis deviation, Left anterior fascicular block    Telemetry: PVCs  Echo:  TTE: EF 40%,  Minimal AR, Nml RV, Trace pericardial effusion, mid anterior  septum,  akinesis of the apex and septum,   may need contrast to evaluate for possibe LV thromubus    Imagin17 Head CT  MPRESSION:   In comparison with prior MRI, there is unchanged volume loss and   microvascular disease without obvious acute hemorrhage or midline shift.      ASSESSMENT & PLAN:   · Assessment	  82 F htn, dm2 (Diet Controlled), hypothyroidism, mild dementia, p/w L sided chest pain concerning for Late Presentation MI vs. NSTEMI    Problem/Plan - 1:  ·  Problem: R/O NSTEMI (non-ST elevated myocardial infarction).  Plan: - Concern for NSTEMI as patient has q waves in V4 & V5 with positive troponin  - s/p ASA & Brilinta load. Hep bolus  - C/w ASA 81mg QD and changed to Plavix daily  - Bedside TTE moderatly reduced left ventricular systolic function  with segmental wall motion abnormalaties.  The mid anterior  septum, the apical inferior wall, the apical anterior wall,  the apical septum, the apical lateral wall, the mid  anterior wall, and the mid inferoseptum are akinetic.  - Plan for medical management; not a candidate for Cath due to dementia and falls  - repeat TTE w/ definity to r/o LV thombus    Problem/Plan - 2:  ·  Problem: R/O Acute heart failure, unspecified heart failure type.  Plan: - Patient has signs of HF- JVD, crackles on exam. Chest x-ray significant for pulmonary edema  - Will treat with gentle lasix dose. 20mg IV  - Monitor In & Out., diurese as needed  - Added low dose betablocker and will add Ace-I when BP tolerates.     Problem/Plan - 3:  ·  Problem: Diabetes.  Plan: - Patient is diet controlled. WDL  A1c 5.6 preDiabetic    Problem/Plan - 4:  ·  Problem: HTN (hypertension).  Plan: - Hold home antihypertensive as patient BP soft. Will consider adding ACE-I in AM    Problem/Plan - 5:  ·  Problem: Hypothyroidism.  Plan: c/w Synthroid 25mcg. Check TSH and follow results.    Problem/Plan - 6:  Problem: Prophylactic measure. Plan: DVT prophylaxis; DASH diet      FOR FOLLOW UP: Cardiology Consult Team

## 2017-08-31 NOTE — H&P ADULT - NSHPPHYSICALEXAM_GEN_ALL_CORE
General: WN/WD NAD  HEENT: PERRLA, EOMI, moist mucous membranes. JVD present  Neurology: A&Ox3, nonfocal, PEREIRA x 4  Respiratory: Crackles in bases.  CV: RRR, S1S2, no murmurs, rubs or gallops  Abdominal: Soft, NT, ND +BS.  Extremities: No edema, + peripheral pulses General: WN/WD NAD  HEENT: PERRLA, EOMI, moist mucous membranes. JVD present  Neurology: A&Ox1, nonfocal, PEREIRA x 4  Respiratory: Crackles in bases.  CV: RRR, S1S2, no murmurs, rubs or gallops  Abdominal: Soft, NT, ND +BS.  Extremities: No edema, + peripheral pulses

## 2017-08-31 NOTE — H&P ADULT - HISTORY OF PRESENT ILLNESS
82 F htn, dm2 (Diet Controlled), hypothyroidism, mild dementia, p/w L sided chest pain since 4pm yest, starting at rest, described as pressure, non radiating, non pleuritic  Denies SOB/Nausea/vomiting. No recent illnesses, Per son, she also had a fall today however patient denies falling. No pleuritic pain. No radiation to back/arm. Denies headache, dizziness, abdominal pain. Of note patient is a poor historian.   EKG: ST elevation in V3-V5 with q waves in V4 & V5. Left axis deviation, Left anterior fascicular block  Labs personally reviewed: CBC wnl, CMP significant for potassium of 5.2 ( Hemolyzed). Paraprotein gap of 4.2, alk phos of 189, AST 67. CK of 432, CKMB 35.9, Trop T .45. VBG pH of 7.35, pCO2 of 55, lactate of 4.3  In the ED patient was aspirin and Brilinta loaded. Given 50mg lopressor PO.  Heparin bolus   TTE from Oct 2016:  EF 60%  Mild diastolic dysfunction (Stage I). Estimated pulmonary artery systolic pressure equals 38 mm Hg, assuming right atrial pressure equals 8 mm Hg,  consistent with borderline pulmonary pressures. 82 F htn, dm2 (Diet Controlled), hypothyroidism, mild dementia, p/w L sided chest pain since 4pm yest, starting at rest, described as pressure, non radiating, non pleuritic  Denies SOB/Nausea/vomiting. No recent illnesses, Per son, she also had a fall today however patient denies falling. No pleuritic pain. No radiation to back/arm. Denies headache, dizziness, abdominal pain. Of note patient is a poor historian.     VS: T: 98, HR: 100-120's, BP: /60-70's, RR: 22 97 % on 2L NC  EKG: ST elevation in V3-V5 with q waves in V4 & V5. Left axis deviation, Left anterior fascicular block  Labs personally reviewed: CBC wnl, CMP significant for potassium of 5.2 ( Hemolyzed). Paraprotein gap of 4.2, alk phos of 189, AST 67. CK of 432, CKMB 35.9, Trop T .45. VBG pH of 7.35, pCO2 of 55, lactate of 4.3  In the ED patient was aspirin and Brilinta loaded. Given 50mg lopressor PO.  Heparin bolus   TTE from Oct 2016:  EF 60%  Mild diastolic dysfunction (Stage I). Estimated pulmonary artery systolic pressure equals 38 mm Hg, assuming right atrial pressure equals 8 mm Hg,  consistent with borderline pulmonary pressures. 82 F htn, dm2 (Diet Controlled), hypothyroidism, mild dementia, p/w L sided chest pain since 4pm yest, starting at rest, described as pressure, non radiating, non pleuritic  Denies SOB/Nausea/vomiting. No recent illnesses, Per son, she also had a fall today however patient denies falling. No pleuritic pain. No radiation to back/arm. Denies headache, dizziness, abdominal pain. Of note patient is a poor historian.   In the CCU patient had episodes of NSVT (polymorphic) for a brief period which resolved.       VS: T: 98, HR: 100-120's, BP: /60-70's, RR: 22 97 % on 2L NC  EKG: ST elevation in V3-V5 with q waves in V4 & V5. Left axis deviation, Left anterior fascicular block  Labs personally reviewed: CBC wnl, CMP significant for potassium of 5.2 ( Hemolyzed). Paraprotein gap of 4.2, alk phos of 189, AST 67. CK of 432, CKMB 35.9, Trop T .45. VBG pH of 7.35, pCO2 of 55, lactate of 4.3  In the ED patient was aspirin and Brilinta loaded. Given 50mg lopressor PO.  Heparin bolus   TTE from Oct 2016:  EF 60%  Mild diastolic dysfunction (Stage I). Estimated pulmonary artery systolic pressure equals 38 mm Hg, assuming right atrial pressure equals 8 mm Hg,  consistent with borderline pulmonary pressures.

## 2017-08-31 NOTE — ED PROVIDER NOTE - PHYSICAL EXAMINATION
Attending Wild: Gen: NAD, heent: atrauamtic, eomi, perrla, mmm, op pink, uvula midline, neck; nttp, no nuchal rigidity, chest: nttp, no crepitus, cv: rrr, +,murmur, lungs: ctab, abd: soft, nontender, nondistended, no peritoneal signs, +BS, no guarding, ext: wwp, neg homans, skin: no rash, neuro: awake and alert, following commands, speech clear, sensation and strength intact, no focal deficits

## 2017-08-31 NOTE — ED PROVIDER NOTE - OBJECTIVE STATEMENT
82 F htn, dm, mild dementia, p/w L sided chest pain since this afternoon, starting at rest, now described as pressure. No SOB/palpitations/diaphoresis. No recent illnesses, Per son, she also had a fall today. No pleuritic pain. No radiation to back/arm. Denies headache, dizziness, abdominal pain. 82 F htn, dm, mild dementia, p/w L sided chest pain since this afternoon, starting at rest, now described as pressure. No SOB/palpitations/diaphoresis. No recent illnesses, Per son, she also had a fall today, no head injury. No pleuritic pain. No radiation to back/arm. Denies headache, dizziness, abdominal pain.  Attending Wild: 81 y/o female h/o htn and dm presenting with left sided chest pain that began today. discomfort initially at rest and now worse. pain described as pressure and located under left breast. mild sob. no diaphoresis. no h/o similar. no headaches, black or bloody stools. or h/o bleeding p;robelms

## 2017-08-31 NOTE — CHART NOTE - NSCHARTNOTEFT_GEN_A_CORE
====================  NEW EVENTS:  ====================    No overnight events. Patient states she has mild Left side chest discomfort, markedly reduced from initial presentation.     ====================  SUMMARY:  ====================  82 y F pmhx  of HTN, HLD, DM( diet controlled), pleasantly confused p/w CP, q waves and LYNDA precordial leads,  signs of HF, loaded w/ ASA/brilinta, hep started in ED, BB given, also given 500 cc for hypotension and admitted to CCU. w/ NSVT in CCU x 3. Not candidate for Cath/PCI due to falls and dementia history will medical managemen    ====================  VITALS:  ====================    ICU Vital Signs Last 24 Hrs  T(C): 37.1 (31 Aug 2017 20:00), Max: 37.1 (31 Aug 2017 20:00)  T(F): 98.8 (31 Aug 2017 20:00), Max: 98.8 (31 Aug 2017 20:00)  HR: 96 (31 Aug 2017 21:00) (94 - 112)  BP: 99/65 (31 Aug 2017 21:00) (81/72 - 134/113)  BP(mean): 75 (31 Aug 2017 21:00) (67 - 86)  ABP: --  ABP(mean): --  RR: 39 (31 Aug 2017 21:00) (14 - 39)  SpO2: 98% (31 Aug 2017 21:00) (95% - 100%)      I&O's Summary    30 Aug 2017 07:01  -  31 Aug 2017 07:00  --------------------------------------------------------  IN: 56.5 mL / OUT: 0 mL / NET: 56.5 mL    31 Aug 2017 07:01  -  31 Aug 2017 22:21  --------------------------------------------------------  IN: 0 mL / OUT: 1100 mL / NET: -1100 mL        Adult Advanced Hemodynamics Last 24 Hrs  CVP(mm Hg): --  CVP(cm H2O): --  CO: --  CI: --  PA: --  PA(mean): --  PCWP: --  SVR: --  SVRI: --  PVR: --  PVRI: --        ====================  LABS:  ====================                          13.1   10.3  )-----------( 159      ( 31 Aug 2017 11:42 )             40.7     08-31    140  |  101  |  19  ----------------------------<  160<H>  4.1   |  23  |  0.87    Ca    9.2      31 Aug 2017 06:06  Phos  3.2     08-31  Mg     1.8     08-31    TPro  7.6  /  Alb  3.8  /  TBili  1.0  /  DBili  x   /  AST  149<H>  /  ALT  31  /  AlkPhos  181<H>  08-31    PT/INR - ( 31 Aug 2017 11:42 )   PT: 12.2 sec;   INR: 1.13 ratio         PTT - ( 31 Aug 2017 11:42 )  PTT:48.8 sec  Troponin T, Serum: 6.36 ng/mL <H> (08-31-17 @ 18:57)  Creatine Kinase, Serum: 2788 U/L <H> (08-31-17 @ 18:57)  Troponin T, Serum: 7.45 ng/mL <H> (08-31-17 @ 11:42)  Creatine Kinase, Serum: 2103 U/L <H> (08-31-17 @ 06:06)  Troponin T, Serum: 3.62 ng/mL <H> (08-31-17 @ 06:06)  Creatine Kinase, Serum: 432 U/L <H> (08-31-17 @ 02:47)  Troponin T, Serum: 0.45 ng/mL <H> (08-31-17 @ 02:47)        ====================  PLAN:  ====================  - Medical management for late STEMI as pt not a candidate for PCI  - C/w ASA & plavix QD  - Started low dose BB 12.5mg lopressor BID today.   - Will start low dose ACE-I ( lopressor 2.5mg QD)    Sarmad Adler M.D.  Medicine Resident, PGY-2  Pager: [phone].

## 2017-08-31 NOTE — ED PROVIDER NOTE - MEDICAL DECISION MAKING DETAILS
82 F with CP, s/p fall. EKG with STEMI, cards consulted. ASA, brillinta, heparin given, labs sent 82 F with CP, s/p fall. EKG with STEMI, cards consulted. ASA, brillinta, heparin given, labs sent  Attending Wild: 83 y/o female presenting with left sided chest pain. ekg shows STEMI. cath consulted called immediately. extensive conversation with fellow as pt with continued chest pressure and serial ekg's showsing st elevation. POCUS shows wall motion abnormality corresponding to stemi. d/w interventionalist who does not feel acute stemi at this time. pt with continued pain. cards fellow consulted multipole times as continued ekgs shows st elevation and intensivist prefers to hold off on cath lab at this time and request CCU admission. pt remained on monitor with tele showing st elevation. while in the ed pt began throwing multiple pvcs. cards fellow at bedside multiple times discussing case with intensivist who feels MI not acute and recommends ccu at this time. pt admitted to the ccu for furhter monitoring. given brilinta and heparin. no focal neurologic deficits or high risk for bleeding

## 2017-08-31 NOTE — H&P ADULT - PROBLEM SELECTOR PLAN 1
- Concern for NSTEMI as patient has q waves in V4 & V5 with positive troponin  - s/p ASA & Brilinta load. Hep bolus  - C/w ASA 81mg QD and Brilinta 90mg BID  - Plan for Cath today - Concern for NSTEMI as patient has q waves in V4 & V5 with positive troponin  - s/p ASA & Brilinta load. Hep bolus  - C/w ASA 81mg QD and Brilinta 90mg BID  - Bedside TTE moderatly reduced left ventricular systolic function  with segmental wall motion abnormalaties.  The mid anterior  septum, the apical inferior wall, the apical anterior wall,  the apical septum, the apical lateral wall, the mid  anterior wall, and the mid inferoseptum are akinetic.  - Plan for Cath today

## 2017-08-31 NOTE — H&P ADULT - NSHPLABSRESULTS_GEN_ALL_CORE
14.3   7.0   )-----------( 166      ( 31 Aug 2017 02:47 )             43.8       08-31    140  |  97  |  22  ----------------------------<  203<H>  5.2   |  27  |  1.06    Ca    9.5      31 Aug 2017 02:47  Phos  3.7     08-31  Mg     2.0     08-31    TPro  8.4<H>  /  Alb  4.2  /  TBili  0.8  /  DBili  x   /  AST  67<H>  /  ALT  31  /  AlkPhos  189<H>  08-31      PT/INR - ( 31 Aug 2017 02:47 )   PT: 11.7 sec;   INR: 1.07 ratio         PTT - ( 31 Aug 2017 02:47 )  PTT:28.5 sec    CARDIAC MARKERS ( 31 Aug 2017 02:47 )  x     / 0.45 ng/mL / 432 U/L / x     / 35.9 ng/mL

## 2017-08-31 NOTE — ED ADULT NURSE NOTE - OBJECTIVE STATEMENT
82 y.o. Female presents to the ED c/o L chest pain. Pt reports feeling chest pain 82 y.o. Female presents to the ED accompanied by son c/o L chest pain. Pt reports feeling chest pain on route to the hospital. Pt reports falling earlier today - unknown time; unknown LOC. Pt reports feeling "pressure behind L bust." Denies palpitations, SOB, N/V/D, urinary/bowel complications, fever/chills. 82 y.o. Female presents to the ED accompanied by son c/o L chest pain. Pt reports feeling chest pain on route to the hospital. Pt reports falling earlier today - unknown time; unknown LOC. Pt reports feeling "pressure behind L bust." Tenderness noted on L side of chest. Murmur noted. Wheezes heard b/l. Denies palpitations, SOB, N/V/D, urinary/bowel complications, fever/chills. Comfort and safety provided.

## 2017-08-31 NOTE — H&P ADULT - ASSESSMENT
82 F htn, dm2 (Diet Controlled), hypothyroidism, mild dementia, p/w L sided chest pain concerning for NSTEMI 82 F htn, dm2 (Diet Controlled), hypothyroidism, mild dementia, p/w L sided chest pain concerning for Late Presentation MI vs. NSTEMI

## 2017-08-31 NOTE — ED PROVIDER NOTE - PROGRESS NOTE DETAILS
Attending Junito: ekg concerning for acute stemi with st elevations, cath consult called Attending Wild: serial ekgs show st elevation V5 and V4. cath attending reviewed ekgs does not feel acute mi at this time, and prefers to hold off with cath. pt on a monitor, will continue serial ekgs. aware of concern for acute mi Attending Junito: repeat ekg shows st elevations, persistent. pt reports pressure to chest. cards fellow and cath attending aware, do not feel pt need cath lab at this time and want to want for troponins Attending Junito: d/w cards fellow pt with continued chest pressure. troponins elevated. pt received heparin and brilinta. pt admitted to ccu Attending Junito: serial ekgs show st elevation V5 and V4. cath attending reviewed ekgs does not feel acute mi at this time, and prefers to hold off with cath. pt on a monitor, will continue serial ekgs. aware of concern for acute mi as bedside echo shows anterior wall hypokinesis. attending aware Attending Junito: pt with continued chest pressure. now becoming tachycardic with frequent pvc. cards fellow re-consulted and recommends giving beta blockers. d/w intensivist who states no no cath at this time

## 2017-08-31 NOTE — ED PROVIDER NOTE - CARE PLAN
Principal Discharge DX:	Chest pain Principal Discharge DX:	STEMI (ST elevation myocardial infarction)

## 2017-08-31 NOTE — ED ADULT NURSE REASSESSMENT NOTE - NS ED NURSE REASSESS COMMENT FT1
Dr. Michel states want to wait for troponin levels until further intervention. Dr. Wild and Dr. Leo aware.

## 2017-09-01 LAB
ALBUMIN SERPL ELPH-MCNC: 3.3 G/DL — SIGNIFICANT CHANGE UP (ref 3.3–5)
ALP SERPL-CCNC: 162 U/L — HIGH (ref 40–120)
ALT FLD-CCNC: 44 U/L RC — SIGNIFICANT CHANGE UP (ref 10–45)
ANION GAP SERPL CALC-SCNC: 14 MMOL/L — SIGNIFICANT CHANGE UP (ref 5–17)
AST SERPL-CCNC: 237 U/L — HIGH (ref 10–40)
BASOPHILS # BLD AUTO: 0 K/UL — SIGNIFICANT CHANGE UP (ref 0–0.2)
BASOPHILS NFR BLD AUTO: 0.4 % — SIGNIFICANT CHANGE UP (ref 0–2)
BILIRUB SERPL-MCNC: 1.4 MG/DL — HIGH (ref 0.2–1.2)
BUN SERPL-MCNC: 20 MG/DL — SIGNIFICANT CHANGE UP (ref 7–23)
CALCIUM SERPL-MCNC: 8.8 MG/DL — SIGNIFICANT CHANGE UP (ref 8.4–10.5)
CHLORIDE SERPL-SCNC: 101 MMOL/L — SIGNIFICANT CHANGE UP (ref 96–108)
CK MB BLD-MCNC: 2.4 % — SIGNIFICANT CHANGE UP (ref 0–3.5)
CK MB CFR SERPL CALC: 47.2 NG/ML — HIGH (ref 0–3.8)
CK SERPL-CCNC: 1981 U/L — HIGH (ref 25–170)
CO2 SERPL-SCNC: 25 MMOL/L — SIGNIFICANT CHANGE UP (ref 22–31)
CREAT SERPL-MCNC: 0.92 MG/DL — SIGNIFICANT CHANGE UP (ref 0.5–1.3)
EOSINOPHIL # BLD AUTO: 0 K/UL — SIGNIFICANT CHANGE UP (ref 0–0.5)
EOSINOPHIL NFR BLD AUTO: 0.2 % — SIGNIFICANT CHANGE UP (ref 0–6)
GLUCOSE SERPL-MCNC: 132 MG/DL — HIGH (ref 70–99)
HCT VFR BLD CALC: 39 % — SIGNIFICANT CHANGE UP (ref 34.5–45)
HCT VFR BLD CALC: 39.1 % — SIGNIFICANT CHANGE UP (ref 34.5–45)
HGB BLD-MCNC: 12.9 G/DL — SIGNIFICANT CHANGE UP (ref 11.5–15.5)
HGB BLD-MCNC: 13 G/DL — SIGNIFICANT CHANGE UP (ref 11.5–15.5)
LYMPHOCYTES # BLD AUTO: 1.7 K/UL — SIGNIFICANT CHANGE UP (ref 1–3.3)
LYMPHOCYTES # BLD AUTO: 19.4 % — SIGNIFICANT CHANGE UP (ref 13–44)
MAGNESIUM SERPL-MCNC: 2.2 MG/DL — SIGNIFICANT CHANGE UP (ref 1.6–2.6)
MCHC RBC-ENTMCNC: 31.1 PG — SIGNIFICANT CHANGE UP (ref 27–34)
MCHC RBC-ENTMCNC: 31.3 PG — SIGNIFICANT CHANGE UP (ref 27–34)
MCHC RBC-ENTMCNC: 33 GM/DL — SIGNIFICANT CHANGE UP (ref 32–36)
MCHC RBC-ENTMCNC: 33.3 GM/DL — SIGNIFICANT CHANGE UP (ref 32–36)
MCV RBC AUTO: 93.9 FL — SIGNIFICANT CHANGE UP (ref 80–100)
MCV RBC AUTO: 94.1 FL — SIGNIFICANT CHANGE UP (ref 80–100)
MONOCYTES # BLD AUTO: 0.9 K/UL — SIGNIFICANT CHANGE UP (ref 0–0.9)
MONOCYTES NFR BLD AUTO: 10.5 % — SIGNIFICANT CHANGE UP (ref 2–14)
NEUTROPHILS # BLD AUTO: 6.1 K/UL — SIGNIFICANT CHANGE UP (ref 1.8–7.4)
NEUTROPHILS NFR BLD AUTO: 69.5 % — SIGNIFICANT CHANGE UP (ref 43–77)
PHOSPHATE SERPL-MCNC: 2.4 MG/DL — LOW (ref 2.5–4.5)
PLATELET # BLD AUTO: 130 K/UL — LOW (ref 150–400)
PLATELET # BLD AUTO: 131 K/UL — LOW (ref 150–400)
POTASSIUM SERPL-MCNC: 3.9 MMOL/L — SIGNIFICANT CHANGE UP (ref 3.5–5.3)
POTASSIUM SERPL-SCNC: 3.9 MMOL/L — SIGNIFICANT CHANGE UP (ref 3.5–5.3)
PROT SERPL-MCNC: 6.8 G/DL — SIGNIFICANT CHANGE UP (ref 6–8.3)
RBC # BLD: 4.15 M/UL — SIGNIFICANT CHANGE UP (ref 3.8–5.2)
RBC # BLD: 4.15 M/UL — SIGNIFICANT CHANGE UP (ref 3.8–5.2)
RBC # FLD: 11.8 % — SIGNIFICANT CHANGE UP (ref 10.3–14.5)
RBC # FLD: 11.9 % — SIGNIFICANT CHANGE UP (ref 10.3–14.5)
SODIUM SERPL-SCNC: 140 MMOL/L — SIGNIFICANT CHANGE UP (ref 135–145)
TROPONIN T SERPL-MCNC: 6.1 NG/ML — HIGH (ref 0–0.06)
WBC # BLD: 8.4 K/UL — SIGNIFICANT CHANGE UP (ref 3.8–10.5)
WBC # BLD: 8.7 K/UL — SIGNIFICANT CHANGE UP (ref 3.8–10.5)
WBC # FLD AUTO: 8.4 K/UL — SIGNIFICANT CHANGE UP (ref 3.8–10.5)
WBC # FLD AUTO: 8.7 K/UL — SIGNIFICANT CHANGE UP (ref 3.8–10.5)

## 2017-09-01 PROCEDURE — 99233 SBSQ HOSP IP/OBS HIGH 50: CPT | Mod: GC

## 2017-09-01 PROCEDURE — 93306 TTE W/DOPPLER COMPLETE: CPT | Mod: 26

## 2017-09-01 RX ORDER — HEPARIN SODIUM 5000 [USP'U]/ML
4000 INJECTION INTRAVENOUS; SUBCUTANEOUS EVERY 6 HOURS
Qty: 0 | Refills: 0 | Status: DISCONTINUED | OUTPATIENT
Start: 2017-09-01 | End: 2017-09-05

## 2017-09-01 RX ORDER — HEPARIN SODIUM 5000 [USP'U]/ML
2000 INJECTION INTRAVENOUS; SUBCUTANEOUS EVERY 6 HOURS
Qty: 0 | Refills: 0 | Status: DISCONTINUED | OUTPATIENT
Start: 2017-09-01 | End: 2017-09-05

## 2017-09-01 RX ORDER — HEPARIN SODIUM 5000 [USP'U]/ML
INJECTION INTRAVENOUS; SUBCUTANEOUS
Qty: 25000 | Refills: 0 | Status: DISCONTINUED | OUTPATIENT
Start: 2017-09-01 | End: 2017-09-05

## 2017-09-01 RX ORDER — POTASSIUM CHLORIDE 20 MEQ
10 PACKET (EA) ORAL ONCE
Qty: 0 | Refills: 0 | Status: COMPLETED | OUTPATIENT
Start: 2017-09-01 | End: 2017-09-01

## 2017-09-01 RX ORDER — POTASSIUM PHOSPHATE, MONOBASIC POTASSIUM PHOSPHATE, DIBASIC 236; 224 MG/ML; MG/ML
15 INJECTION, SOLUTION INTRAVENOUS ONCE
Qty: 0 | Refills: 0 | Status: COMPLETED | OUTPATIENT
Start: 2017-09-01 | End: 2017-09-01

## 2017-09-01 RX ADMIN — Medication 10 MILLIEQUIVALENT(S): at 05:05

## 2017-09-01 RX ADMIN — Medication 25 MICROGRAM(S): at 12:48

## 2017-09-01 RX ADMIN — POTASSIUM PHOSPHATE, MONOBASIC POTASSIUM PHOSPHATE, DIBASIC 62.5 MILLIMOLE(S): 236; 224 INJECTION, SOLUTION INTRAVENOUS at 05:05

## 2017-09-01 RX ADMIN — HEPARIN SODIUM 5000 UNIT(S): 5000 INJECTION INTRAVENOUS; SUBCUTANEOUS at 12:48

## 2017-09-01 RX ADMIN — HEPARIN SODIUM 1000 UNIT(S)/HR: 5000 INJECTION INTRAVENOUS; SUBCUTANEOUS at 16:24

## 2017-09-01 RX ADMIN — HEPARIN SODIUM 5000 UNIT(S): 5000 INJECTION INTRAVENOUS; SUBCUTANEOUS at 05:06

## 2017-09-01 RX ADMIN — ATORVASTATIN CALCIUM 40 MILLIGRAM(S): 80 TABLET, FILM COATED ORAL at 22:53

## 2017-09-01 RX ADMIN — Medication 81 MILLIGRAM(S): at 12:48

## 2017-09-01 RX ADMIN — Medication 12.5 MILLIGRAM(S): at 05:05

## 2017-09-01 RX ADMIN — Medication 12.5 MILLIGRAM(S): at 18:32

## 2017-09-01 RX ADMIN — CLOPIDOGREL BISULFATE 75 MILLIGRAM(S): 75 TABLET, FILM COATED ORAL at 12:48

## 2017-09-01 NOTE — PROGRESS NOTE ADULT - PROBLEM SELECTOR PLAN 2
Bedside TTE at CCU with moderatly reduced left ventricular systolic function  and segmental wall motion abnormalaties. The mid anterior  septum, the apical inferior wall, the apical anterior wall,  the apical septum, the apical lateral wall, the mid  anterior wall, and the mid inferoseptum are akinetic.    - Had rales on exam, however no other signs of fluid overload.  - Consider diuresis with 20 mg daily PO lasix  - Monitor Ins & Outs.  - Continue BB & Ace-I as BP tolerates

## 2017-09-01 NOTE — PROGRESS NOTE ADULT - SUBJECTIVE AND OBJECTIVE BOX
Patient is a 82y old  Female who presents with a chief complaint of Chest Pain (31 Aug 2017 05:20)      SUBJECTIVE / OVERNIGHT EVENTS:    MEDICATIONS  (STANDING):  aspirin enteric coated 81 milliGRAM(s) Oral daily  levothyroxine 25 MICROGram(s) Oral daily  atorvastatin 40 milliGRAM(s) Oral at bedtime  clopidogrel Tablet 75 milliGRAM(s) Oral daily  metoprolol 12.5 milliGRAM(s) Oral every 12 hours  lisinopril 2.5 milliGRAM(s) Oral daily  heparin  Injectable 5000 Unit(s) SubCutaneous every 8 hours    MEDICATIONS  (PRN):      CAPILLARY BLOOD GLUCOSE        I&O's Summary    31 Aug 2017 07:01  -  01 Sep 2017 07:00  --------------------------------------------------------  IN: 15 mL / OUT: 2100 mL / NET: -2085 mL        PHYSICAL EXAM:  GENERAL: NAD, well-developed  HEAD:  Atraumatic, Normocephalic  EYES:  NECK:   CHEST/LUNG: Clear to auscultation bilaterally; No wheezes, rhonchi or gallops  HEART: Regular rate and rhythm; No murmurs, rubs, or gallops  ABDOMEN: Soft, Nontender, Nondistended; Bowel sounds present  EXTREMITIES:  2+ Peripheral Pulses, No clubbing, cyanosis, or edema  PSYCH: Appropriate  NEUROLOGY: non-focal  SKIN: No rashes or lesions    LABS:                        13.0   8.7   )-----------( 130      ( 01 Sep 2017 02:24 )             39.0     09-01    140  |  101  |  20  ----------------------------<  132<H>  3.9   |  25  |  0.92    Ca    8.8      01 Sep 2017 02:24  Phos  2.4     09-01  Mg     2.2     09-01    TPro  6.8  /  Alb  3.3  /  TBili  1.4<H>  /  DBili  x   /  AST  237<H>  /  ALT  44  /  AlkPhos  162<H>  09-01    PT/INR - ( 31 Aug 2017 11:42 )   PT: 12.2 sec;   INR: 1.13 ratio         PTT - ( 31 Aug 2017 11:42 )  PTT:48.8 sec  CARDIAC MARKERS ( 01 Sep 2017 02:24 )  x     / 6.10 ng/mL / 1981 U/L / x     / 47.2 ng/mL  CARDIAC MARKERS ( 31 Aug 2017 18:57 )  x     / 6.36 ng/mL / 2788 U/L / x     / 132.9 ng/mL  CARDIAC MARKERS ( 31 Aug 2017 11:42 )  x     / 7.45 ng/mL / x     / x     / 279.1 ng/mL  CARDIAC MARKERS ( 31 Aug 2017 06:06 )  x     / 3.62 ng/mL / 2103 U/L / x     / 273.2 ng/mL  CARDIAC MARKERS ( 31 Aug 2017 02:47 )  x     / 0.45 ng/mL / 432 U/L / x     / 35.9 ng/mL          RADIOLOGY & ADDITIONAL TESTS:    Imaging Personally Reviewed:    Consultant(s) Notes Reviewed:      Care Discussed with Consultants/Other Providers: Medicine Accept Note and Progress Note    Patient is a 82y old  Female who presents with a chief complaint of Chest Pain (31 Aug 2017 05:20)    Hospital Course:  80 yo HTN, HLD, DM, hypothyroidism, pleasantly confused p/w fall and left sided CP, q waves and LYNDA precordial leads, concerning for NSTEMI vs late presentation MI. CT head was negative for acute bleed, and had unchanged volume loss. CXR was notable for vascular congestion. She was loaded w/ ASA/brilinta, started on heparin in ED, BB given, also given 500 cc for hypotension and admitted to CCU. w/ NSVT in CCU x 3. Not candidate for Cath/PCI due to falls and dementia history. Now transferred to floor for medical management and pending TTE with definity to r/o LV thrombus.     SUBJECTIVE / OVERNIGHT EVENTS:  Was transferred to floor. AxOx2 this am (name and place, May 2018). Felt "not great" but could not elaborate as to why. Had slight headache and dizziness. Denied getting out of bed. Denied shortness of breath and chest pain.     Telemetry: no events overnight. Sinus .       MEDICATIONS  (STANDING):  aspirin enteric coated 81 milliGRAM(s) Oral daily  levothyroxine 25 MICROGram(s) Oral daily  atorvastatin 40 milliGRAM(s) Oral at bedtime  clopidogrel Tablet 75 milliGRAM(s) Oral daily  metoprolol 12.5 milliGRAM(s) Oral every 12 hours  lisinopril 2.5 milliGRAM(s) Oral daily  heparin  Injectable 5000 Unit(s) SubCutaneous every 8 hours    Vital Signs Last 24 Hrs  T(C): 36.9 (01 Sep 2017 06:05), Max: 37.1 (31 Aug 2017 20:00)  T(F): 98.4 (01 Sep 2017 06:05), Max: 98.8 (31 Aug 2017 20:00)  HR: 96 (01 Sep 2017 06:05) (90 - 112)  BP: 104/69 (01 Sep 2017 06:05) (87/59 - 112/73)  BP(mean): 93 (01 Sep 2017 05:00) (66 - 93)  RR: 20 (01 Sep 2017 06:05) (15 - 39)  SpO2: 92% (01 Sep 2017 06:05) (83% - 100%)      I&O's Summary    31 Aug 2017 07:01  -  01 Sep 2017 07:00  --------------------------------------------------------  IN: 15 mL / OUT: 2100 mL / NET: -2085 mL        PHYSICAL EXAM:  GENERAL: NAD, well-developed  HEAD:  Atraumatic, Normocephalic  EYES: EOMI  NECK: No JVD  CHEST/LUNG: Mild wheezes and rales  HEART: Regular rate and rhythm; No murmurs, rubs, or gallops  ABDOMEN: Soft, Nontender, Nondistended; Bowel sounds present  EXTREMITIES:  2+ Peripheral Pulses, No edema  PSYCH: AxOx2  NEUROLOGY: non-focal  SKIN: No rashes or lesions    LABS:                        13.0   8.7   )-----------( 130      ( 01 Sep 2017 02:24 )             39.0     09-01    140  |  101  |  20  ----------------------------<  132<H>  3.9   |  25  |  0.92    Ca    8.8      01 Sep 2017 02:24  Phos  2.4     09-01  Mg     2.2     09-01    TPro  6.8  /  Alb  3.3  /  TBili  1.4<H>  /  DBili  x   /  AST  237<H>  /  ALT  44  /  AlkPhos  162<H>  09-01    PT/INR - ( 31 Aug 2017 11:42 )   PT: 12.2 sec;   INR: 1.13 ratio         PTT - ( 31 Aug 2017 11:42 )  PTT:48.8 sec  CARDIAC MARKERS ( 01 Sep 2017 02:24 )  x     / 6.10 ng/mL / 1981 U/L / x     / 47.2 ng/mL  CARDIAC MARKERS ( 31 Aug 2017 18:57 )  x     / 6.36 ng/mL / 2788 U/L / x     / 132.9 ng/mL  CARDIAC MARKERS ( 31 Aug 2017 11:42 )  x     / 7.45 ng/mL / x     / x     / 279.1 ng/mL  CARDIAC MARKERS ( 31 Aug 2017 06:06 )  x     / 3.62 ng/mL / 2103 U/L / x     / 273.2 ng/mL  CARDIAC MARKERS ( 31 Aug 2017 02:47 )  x     / 0.45 ng/mL / 432 U/L / x     / 35.9 ng/mL          RADIOLOGY & ADDITIONAL TESTS:    Imaging Personally Reviewed:    Consultant(s) Notes Reviewed:      Care Discussed with Consultants/Other Providers:

## 2017-09-01 NOTE — CHART NOTE - NSCHARTNOTEFT_GEN_A_CORE
CCU Transfer Note    Transfer from: CCU    Transfer to:    ( x ) Telemetry         Accepting Physician:    Signout given to:     CCU COURSE:  Dx: NSTEMI/ADHF  80 yo HTN, HLD, DM, pleasantly confused p/w CP, q waves and LYNDA precordial leads,  signs of HF, loaded w/ ASA/brilinta, hep started in ED, BB given, also given 500 cc for hypotension and admitted to CCU. w/ NSVT in CCU x 3. Not candidate for Cath/PCI due to falls and dementia history will medical management     PAST MEDICAL & SURGICAL HISTORY:  Diabetes  Hypothyroidism  HTN (hypertension)  S/P tonsillectomy  H/O abdominal hysterectomy      Vital Signs Last 24 Hrs  T(C): 36.8 (31 Aug 2017 16:00), Max: 36.8 (31 Aug 2017 02:12)  T(F): 98.3 (31 Aug 2017 16:00), Max: 98.3 (31 Aug 2017 16:00)  HR: 96 (31 Aug 2017 17:00) (94 - 112)  BP: 110/74 (31 Aug 2017 17:00) (81/72 - 134/113)  BP(mean): 84 (31 Aug 2017 17:00) (67 - 84)  RR: 20 (31 Aug 2017 17:00) (14 - 32)  SpO2: 100% (31 Aug 2017 17:00) (95% - 100%)  I&O's Summary    30 Aug 2017 07:  -  31 Aug 2017 07:00  --------------------------------------------------------  IN: 56.5 mL / OUT: 0 mL / NET: 56.5 mL    31 Aug 2017 07:  -  31 Aug 2017 17:10  --------------------------------------------------------  IN: 0 mL / OUT: 1100 mL / NET: -1100 mL  REVIEW OF SYSTEMS:    CONSTITUTIONAL: No weakness, fevers or chills  EYES/ENT: No visual changes;  No vertigo or throat pain   NECK: No pain or stiffness  RESPIRATORY: No cough, wheezing, hemoptysis; No shortness of breath  CARDIOVASCULAR: No chest pain or palpitations  GASTROINTESTINAL: No abdominal or epigastric pain. No nausea, vomiting, or hematemesis; No diarrhea or constipation. No melena or hematochezia.  GENITOURINARY: No dysuria, frequency or hematuria  NEUROLOGICAL: No numbness or weakness  SKIN: No itching, rashes    General: WN/WD NAD  Neurology: A&Ox2, MMS severe dementia, nonfocal, PEREIRA x 4  Respiratory: CTA B/L  CV: RRR, S1S2, no murmurs, rubs or gallops  Abdominal: Soft, NT, ND +BS, Last BM  Extremities: No edema, + peripheral pulses    Allergies    streptomycin (Unknown)    Intolerances      MEDICATIONS  (STANDING):  aspirin enteric coated 81 milliGRAM(s) Oral daily  levothyroxine 25 MICROGram(s) Oral daily  atorvastatin 40 milliGRAM(s) Oral at bedtime  clopidogrel Tablet 75 milliGRAM(s) Oral daily  metoprolol 12.5 milliGRAM(s) Oral every 12 hours    CARDIAC MARKERS ( 31 Aug 2017 11:42 )  x     / 7.45 ng/mL / x     / x     / 279.1 ng/mL  CARDIAC MARKERS ( 31 Aug 2017 06:06 )  x     / 3.62 ng/mL / 2103 U/L / x     / 273.2 ng/mL  CARDIAC MARKERS ( 31 Aug 2017 02:47 )  x     / 0.45 ng/mL / 432 U/L / x     / 35.9 ng/mL                            13.1   10.3  )-----------( 159      ( 31 Aug 2017 11:42 )             40.7         140  |  101  |  19  ----------------------------<  160<H>  4.1   |  23  |  0.87    Ca    9.2      31 Aug 2017 06:06  Phos  3.2       Mg     1.8         TPro  7.6  /  Alb  3.8  /  TBili  1.0  /  DBili  x   /  AST  149<H>  /  ALT  31  /  AlkPhos  181<H>      PT/INR - ( 31 Aug 2017 11:42 )   PT: 12.2 sec;   INR: 1.13 ratio         PTT - ( 31 Aug 2017 11:42 )  PTT:48.8 sec  PHYSICAL EXAM:    EKG: ST elevation in V3-V5 with q waves in V4 & V5. Left axis deviation, Left anterior fascicular block    Telemetry: PVCs  Echo:  TTE: EF 40%,  Minimal AR, Nml RV, Trace pericardial effusion, mid anterior  septum,  akinesis of the apex and septum,   may need contrast to evaluate for possibe LV thromubus    Imagin17 Head CT  IMPRESSION:   In comparison with prior MRI, there is unchanged volume loss and   microvascular disease without obvious acute hemorrhage or midline shift.      ASSESSMENT & PLAN:   · Assessment	  82 F htn, dm2 (Diet Controlled), hypothyroidism, mild dementia, p/w L sided chest pain concerning for Late Presentation MI vs. NSTEMI    Problem/Plan - 1:  -Problem: Late presentation MI  Plan: -q waves in V4 & V5 with positive troponin. Plan for medical management as per cath team.Enzymes downtrending   -c/w ASA and Plavix.   -PT currently on BB,ACEi and High intensity statin    -repeat TTE w/ definity to r/o LV thombus    Problem/Plan - 2:  ·  Problem: Acute heart failure, unspecified heart failure type.  Plan: -PT clinically euvolemic on my exam   - Monitor In & Out  - c/w ACEi and BB    Problem/Plan - 3:  ·  Problem: Diabetes.  Plan: - Patient is diet controlled. WDL  A1c 5.6 preDiabetic    Problem/Plan - 4:  ·  Problem: HTN (hypertension).  Well controlled at current evaluation  -c/w ACEi and BB    Problem/Plan - 5:  ·  Problem: Hypothyroidism.  Plan: c/w Synthroid 25mcg.     Problem/Plan - 6:  Problem: Prophylactic measure. Plan: DVT prophylaxis; DASH diet    Problem/Plan - 7:Elevated AST. Likely 2/2 to NSTEMI and improving   -continue to monitor daily CMP       FOR FOLLOW UP: Cardiology Consult Team

## 2017-09-01 NOTE — DISCHARGE NOTE ADULT - PATIENT PORTAL LINK FT
“You can access the [employer] Patient Portal, offered by [facility], by registering with the following website: [email or web address]”

## 2017-09-01 NOTE — PROGRESS NOTE ADULT - PROBLEM SELECTOR PLAN 1
Concern for NSTEMI as patient has q waves in V4 & V5 with positive troponin. Chest pain now resolved. Not candidate for cath due to underlying dementia.   - s/p ASA, Brilinta load and heparin.  - Continue ASA and plavix  - Pending TTE with definity contrast to r/o LV thrombus

## 2017-09-01 NOTE — PROGRESS NOTE ADULT - SUBJECTIVE AND OBJECTIVE BOX
24H hour events: No acute events. Transferred from CCU overnight.    MEDICATIONS:  aspirin enteric coated 81 milliGRAM(s) Oral daily  clopidogrel Tablet 75 milliGRAM(s) Oral daily  metoprolol 12.5 milliGRAM(s) Oral every 12 hours  lisinopril 2.5 milliGRAM(s) Oral daily  heparin  Injectable 5000 Unit(s) SubCutaneous every 8 hours      levothyroxine 25 MICROGram(s) Oral daily  atorvastatin 40 milliGRAM(s) Oral at bedtime      REVIEW OF SYSTEMS:  Feels well, denies SOB, sleeps at incline for comfort, denies CP, palpitations, LH. Has not walked but is waiting for PT as she ambulates at home.  Complete 10point ROS negative.    PHYSICAL EXAM:  T(C): 36.9 (09-01-17 @ 06:05), Max: 37.1 (08-31-17 @ 20:00)  HR: 96 (09-01-17 @ 06:05) (90 - 112)  BP: 104/69 (09-01-17 @ 06:05) (87/59 - 112/73)  RR: 20 (09-01-17 @ 06:05) (20 - 39)  SpO2: 92% (09-01-17 @ 06:05) (83% - 100%)  Wt(kg): --  I&O's Summary    31 Aug 2017 07:01  -  01 Sep 2017 07:00  --------------------------------------------------------  IN: 15 mL / OUT: 2100 mL / NET: -2085 mL    01 Sep 2017 07:01  -  01 Sep 2017 11:43  --------------------------------------------------------  IN: 360 mL / OUT: 300 mL / NET: 60 mL    Appearance: Normal	  HEENT:   Normal oral mucosa, PERRL, EOMI	  Lymphatic: No lymphadenopathy  Cardiovascular: Normal S1 S2, JVD 8cm, upper LSP 2/6 systolic, No edema  Respiratory: rales lower lung fields.  Psychiatry: A & O x 2 (year 2020, however, she much preferred Obama to current president Saturnino which suggests processing intact), Mood & affect appropriate  Gastrointestinal:  Soft, Non-tender, + BS	  Skin: No rashes, No ecchymoses, No cyanosis	  Neurologic: Non-focal  Extremities: Normal range of motion, No clubbing, cyanosis or edema  Vascular: Peripheral pulses palpable 2+ bilaterally      LABS:	 	    CBC Full  -  ( 01 Sep 2017 02:24 )  WBC Count : 8.7 K/uL  Hemoglobin : 13.0 g/dL  Hematocrit : 39.0 %  Platelet Count - Automated : 130 K/uL  Mean Cell Volume : 93.9 fl  Mean Cell Hemoglobin : 31.3 pg  Mean Cell Hemoglobin Concentration : 33.3 gm/dL  Auto Neutrophil # : 6.1 K/uL  Auto Lymphocyte # : 1.7 K/uL  Auto Monocyte # : 0.9 K/uL  Auto Eosinophil # : 0.0 K/uL  Auto Basophil # : 0.0 K/uL  Auto Neutrophil % : 69.5 %  Auto Lymphocyte % : 19.4 %  Auto Monocyte % : 10.5 %  Auto Eosinophil % : 0.2 %  Auto Basophil % : 0.4 %    09-01    140  |  101  |  20  ----------------------------<  132<H>  3.9   |  25  |  0.92  08-31    140  |  101  |  19  ----------------------------<  160<H>  4.1   |  23  |  0.87    Ca    8.8      01 Sep 2017 02:24  Ca    9.2      31 Aug 2017 06:06  Phos  2.4     09-01  Phos  3.2     08-31  Mg     2.2     09-01  Mg     1.8     08-31    TPro  6.8  /  Alb  3.3  /  TBili  1.4<H>  /  DBili  x   /  AST  237<H>  /  ALT  44  /  AlkPhos  162<H>  09-01  TPro  7.6  /  Alb  3.8  /  TBili  1.0  /  DBili  x   /  AST  149<H>  /  ALT  31  /  AlkPhos  181<H>  08-31      proBNP: Serum Pro-Brain Natriuretic Peptide: 706 pg/mL (08-31 @ 06:06)      TELEMETRY: 	  sinus     	  ASSESSMENT/PLAN: 24H hour events: Overnight brief episode of CP now resolved. Transferred from CCU overnight.    MEDICATIONS:  aspirin enteric coated 81 milliGRAM(s) Oral daily  clopidogrel Tablet 75 milliGRAM(s) Oral daily  metoprolol 12.5 milliGRAM(s) Oral every 12 hours  lisinopril 2.5 milliGRAM(s) Oral daily  heparin  Injectable 5000 Unit(s) SubCutaneous every 8 hours      levothyroxine 25 MICROGram(s) Oral daily  atorvastatin 40 milliGRAM(s) Oral at bedtime      REVIEW OF SYSTEMS:  Feels well, denies SOB, sleeps at incline for comfort, denies CP, palpitations, LH. Has not walked but is waiting for PT as she ambulates at home.  Complete 10point ROS negative.    PHYSICAL EXAM:  T(C): 36.9 (09-01-17 @ 06:05), Max: 37.1 (08-31-17 @ 20:00)  HR: 96 (09-01-17 @ 06:05) (90 - 112)  BP: 104/69 (09-01-17 @ 06:05) (87/59 - 112/73)  RR: 20 (09-01-17 @ 06:05) (20 - 39)  SpO2: 92% (09-01-17 @ 06:05) (83% - 100%)  Wt(kg): --  I&O's Summary    31 Aug 2017 07:01  -  01 Sep 2017 07:00  --------------------------------------------------------  IN: 15 mL / OUT: 2100 mL / NET: -2085 mL    01 Sep 2017 07:01  -  01 Sep 2017 11:43  --------------------------------------------------------  IN: 360 mL / OUT: 300 mL / NET: 60 mL    Appearance: Normal	  HEENT:   Normal oral mucosa, PERRL, EOMI	  Lymphatic: No lymphadenopathy  Cardiovascular: Normal S1 S2, JVD 8cm, upper LSP 2/6 systolic, No edema  Respiratory: rales lower lung fields.  Psychiatry: A & O x 2 (year 2020, however, she much preferred Obama to current president Saturnino which suggests processing intact), Mood & affect appropriate  Gastrointestinal:  Soft, Non-tender, + BS	  Skin: No rashes, No ecchymoses, No cyanosis	  Neurologic: Non-focal  Extremities: Normal range of motion, No clubbing, cyanosis or edema  Vascular: Peripheral pulses palpable 2+ bilaterally      LABS:	 	    CBC Full  -  ( 01 Sep 2017 02:24 )  WBC Count : 8.7 K/uL  Hemoglobin : 13.0 g/dL  Hematocrit : 39.0 %  Platelet Count - Automated : 130 K/uL  Mean Cell Volume : 93.9 fl  Mean Cell Hemoglobin : 31.3 pg  Mean Cell Hemoglobin Concentration : 33.3 gm/dL  Auto Neutrophil # : 6.1 K/uL  Auto Lymphocyte # : 1.7 K/uL  Auto Monocyte # : 0.9 K/uL  Auto Eosinophil # : 0.0 K/uL  Auto Basophil # : 0.0 K/uL  Auto Neutrophil % : 69.5 %  Auto Lymphocyte % : 19.4 %  Auto Monocyte % : 10.5 %  Auto Eosinophil % : 0.2 %  Auto Basophil % : 0.4 %    09-01    140  |  101  |  20  ----------------------------<  132<H>  3.9   |  25  |  0.92  08-31    140  |  101  |  19  ----------------------------<  160<H>  4.1   |  23  |  0.87    Ca    8.8      01 Sep 2017 02:24  Ca    9.2      31 Aug 2017 06:06  Phos  2.4     09-01  Phos  3.2     08-31  Mg     2.2     09-01  Mg     1.8     08-31    TPro  6.8  /  Alb  3.3  /  TBili  1.4<H>  /  DBili  x   /  AST  237<H>  /  ALT  44  /  AlkPhos  162<H>  09-01  TPro  7.6  /  Alb  3.8  /  TBili  1.0  /  DBili  x   /  AST  149<H>  /  ALT  31  /  AlkPhos  181<H>  08-31      proBNP: Serum Pro-Brain Natriuretic Peptide: 706 pg/mL (08-31 @ 06:06)      TELEMETRY: 	  sinus     < from: Transthoracic Echocardiogram (08.31.17 @ 05:50) >  Dimensions:    Normal Values:  LA:            2.0 - 4.0 cm  Ao:         2.0 - 3.8 cm  SEPTUM:        0.6 - 1.2 cm  PWT:           0.6 - 1.1 cm  LVIDd:         3.0 - 5.6 cm  LVIDs:         1.8 - 4.0 cm  EF (Visual Estimate): 40 %    < end of copied text >  < from: Transthoracic Echocardiogram (08.31.17 @ 05:50) >  Conclusions:  STAT Bedside ECHO to evaluate for wall motion.  1. Normal mitral valve. No mitral valve regurgitation seen.  2. Mild calcificationof the Aortic valve.  The Aortic  valve is trileaflet, opens well.  Minimal aortic  regurgitation.  3. Moderatly reduced left ventricular systolic function  with segmental wall motion abnormalaties.  The mid anterior  septum, the apical inferior wall, the apical anterior wall,  the apical septum, the apical lateral wall, the mid  anterior wall, and the mid inferoseptum are akinetic.  4. Normal right atrium.  5. Normal right ventricular size and function.  6. Normal pericardium with trace pericardial effusion.  *** Compared with echocardiogram of 10/4/2016, The is now  akinesis of the apex and septum.  Reccomendation: complete echocardiogram with contrast to  evaluate for possibe LV thromubus.    < end of copied text >  	    ASSESSMENT/PLAN: 	    82 year old woman w/ hx htn, dm2 (Diet Controlled), hypothyroidism, ?mild dementia, p/w L sided chest pain concerning for Late Presentation MI     - cont asa/plavix dapt for 12mo then asa indefinitely   - repeat TTE w/ definity at 72hrs r/o LV thrombus [if indeterminate "smoke" may progress to cardiac MRI], if thrombus found will need hep gtt bridge to coumadin goal inr 2-3  - assessed by interventional and deemed poor candidate for revascularization  -HF-OMT - convert to metoprolol succinate 25mg daily, titrate lisinopril as BP tolerates (SBP <90), add spironolactone when able  - maintain negative balance daily 1L (prn lasix)    [id number]

## 2017-09-02 DIAGNOSIS — I82.90 ACUTE EMBOLISM AND THROMBOSIS OF UNSPECIFIED VEIN: ICD-10-CM

## 2017-09-02 DIAGNOSIS — R42 DIZZINESS AND GIDDINESS: ICD-10-CM

## 2017-09-02 LAB
ALBUMIN SERPL ELPH-MCNC: 3.4 G/DL — SIGNIFICANT CHANGE UP (ref 3.3–5)
ALP SERPL-CCNC: 176 U/L — HIGH (ref 40–120)
ALT FLD-CCNC: 70 U/L — HIGH (ref 10–45)
ANION GAP SERPL CALC-SCNC: 15 MMOL/L — SIGNIFICANT CHANGE UP (ref 5–17)
APTT BLD: 106.9 SEC — HIGH (ref 27.5–37.4)
APTT BLD: 154 SEC — CRITICAL HIGH (ref 27.5–37.4)
APTT BLD: 47.4 SEC — HIGH (ref 27.5–37.4)
AST SERPL-CCNC: 177 U/L — HIGH (ref 10–40)
BILIRUB SERPL-MCNC: 1.9 MG/DL — HIGH (ref 0.2–1.2)
BUN SERPL-MCNC: 18 MG/DL — SIGNIFICANT CHANGE UP (ref 7–23)
CALCIUM SERPL-MCNC: 9.2 MG/DL — SIGNIFICANT CHANGE UP (ref 8.4–10.5)
CHLORIDE SERPL-SCNC: 101 MMOL/L — SIGNIFICANT CHANGE UP (ref 96–108)
CO2 SERPL-SCNC: 22 MMOL/L — SIGNIFICANT CHANGE UP (ref 22–31)
CREAT SERPL-MCNC: 0.99 MG/DL — SIGNIFICANT CHANGE UP (ref 0.5–1.3)
GLUCOSE SERPL-MCNC: 121 MG/DL — HIGH (ref 70–99)
HCT VFR BLD CALC: 39.7 % — SIGNIFICANT CHANGE UP (ref 34.5–45)
HGB BLD-MCNC: 12.7 G/DL — SIGNIFICANT CHANGE UP (ref 11.5–15.5)
MAGNESIUM SERPL-MCNC: 2.1 MG/DL — SIGNIFICANT CHANGE UP (ref 1.6–2.6)
MCHC RBC-ENTMCNC: 29.3 PG — SIGNIFICANT CHANGE UP (ref 27–34)
MCHC RBC-ENTMCNC: 32 GM/DL — SIGNIFICANT CHANGE UP (ref 32–36)
MCV RBC AUTO: 91.5 FL — SIGNIFICANT CHANGE UP (ref 80–100)
PHOSPHATE SERPL-MCNC: 2.7 MG/DL — SIGNIFICANT CHANGE UP (ref 2.5–4.5)
PLATELET # BLD AUTO: 159 K/UL — SIGNIFICANT CHANGE UP (ref 150–400)
POTASSIUM SERPL-MCNC: 4.4 MMOL/L — SIGNIFICANT CHANGE UP (ref 3.5–5.3)
POTASSIUM SERPL-SCNC: 4.4 MMOL/L — SIGNIFICANT CHANGE UP (ref 3.5–5.3)
PROT SERPL-MCNC: 7.4 G/DL — SIGNIFICANT CHANGE UP (ref 6–8.3)
RBC # BLD: 4.34 M/UL — SIGNIFICANT CHANGE UP (ref 3.8–5.2)
RBC # FLD: 13.5 % — SIGNIFICANT CHANGE UP (ref 10.3–14.5)
SODIUM SERPL-SCNC: 138 MMOL/L — SIGNIFICANT CHANGE UP (ref 135–145)
WBC # BLD: 7.78 K/UL — SIGNIFICANT CHANGE UP (ref 3.8–10.5)
WBC # FLD AUTO: 7.78 K/UL — SIGNIFICANT CHANGE UP (ref 3.8–10.5)

## 2017-09-02 PROCEDURE — 99233 SBSQ HOSP IP/OBS HIGH 50: CPT | Mod: GC

## 2017-09-02 RX ORDER — LISINOPRIL 2.5 MG/1
2.5 TABLET ORAL DAILY
Qty: 0 | Refills: 0 | Status: DISCONTINUED | OUTPATIENT
Start: 2017-09-02 | End: 2017-09-02

## 2017-09-02 RX ORDER — FUROSEMIDE 40 MG
20 TABLET ORAL ONCE
Qty: 0 | Refills: 0 | Status: DISCONTINUED | OUTPATIENT
Start: 2017-09-02 | End: 2017-09-02

## 2017-09-02 RX ADMIN — CLOPIDOGREL BISULFATE 75 MILLIGRAM(S): 75 TABLET, FILM COATED ORAL at 11:14

## 2017-09-02 RX ADMIN — HEPARIN SODIUM 800 UNIT(S)/HR: 5000 INJECTION INTRAVENOUS; SUBCUTANEOUS at 01:09

## 2017-09-02 RX ADMIN — ATORVASTATIN CALCIUM 40 MILLIGRAM(S): 80 TABLET, FILM COATED ORAL at 21:59

## 2017-09-02 RX ADMIN — HEPARIN SODIUM 0 UNIT(S)/HR: 5000 INJECTION INTRAVENOUS; SUBCUTANEOUS at 00:07

## 2017-09-02 RX ADMIN — HEPARIN SODIUM 800 UNIT(S)/HR: 5000 INJECTION INTRAVENOUS; SUBCUTANEOUS at 18:34

## 2017-09-02 RX ADMIN — Medication 12.5 MILLIGRAM(S): at 05:52

## 2017-09-02 RX ADMIN — Medication 81 MILLIGRAM(S): at 11:14

## 2017-09-02 RX ADMIN — HEPARIN SODIUM 2000 UNIT(S): 5000 INJECTION INTRAVENOUS; SUBCUTANEOUS at 18:36

## 2017-09-02 RX ADMIN — Medication 12.5 MILLIGRAM(S): at 17:08

## 2017-09-02 RX ADMIN — Medication 25 MICROGRAM(S): at 17:08

## 2017-09-02 RX ADMIN — HEPARIN SODIUM 700 UNIT(S)/HR: 5000 INJECTION INTRAVENOUS; SUBCUTANEOUS at 11:15

## 2017-09-02 NOTE — PROGRESS NOTE ADULT - PROBLEM SELECTOR PLAN 3
- Patient is diet controlled. HgA1c 5.6%. Well controlled. Bedside TTE at CCU with moderately reduced left ventricular systolic function  and segmental wall motion abnormalities. The mid anterior  septum, the apical inferior wall, the apical anterior wall,  the apical septum, the apical lateral wall, the mid  anterior wall, and the mid inferoseptum are akinetic.    - Had rales on exam, however no other signs of fluid overload.  - Consider diuresis with 20 mg daily PO lasix  - Monitor Ins & Outs.  - Continue BB & Ace-I as BP tolerates  - Switch to metoprolol succinate if BP tolerates Bedside TTE at CCU with moderately reduced left ventricular systolic function  and segmental wall motion abnormalities. The mid anterior  septum, the apical inferior wall, the apical anterior wall,  the apical septum, the apical lateral wall, the mid  anterior wall, and the mid inferoseptum are akinetic.    - Had rales on exam, however no other signs of fluid overload.  - Consider diuresis with 20 mg daily PO lasix  - Monitor Ins & Outs.  - Continue BB as BP tolerates. Add back ace-inh if BP tolerates.  - Switch to metoprolol succinate if BP tolerates Concern for Late presentation MI vs NSTEMI as patient has q waves in V4 & V5 with positive troponin. Chest pain now resolved. Not candidate for cath due to underlying dementia.   - s/p ASA, Brilinta load and heparin.  - Continue ASA and plavix  - Indeterminant TTE with definity contrast  - Pending MRI to r/o LV thrombus  - Appreciate cardiology f/u and note

## 2017-09-02 NOTE — PROGRESS NOTE ADULT - SUBJECTIVE AND OBJECTIVE BOX
24H hour events: No acute events.     MEDICATIONS:  aspirin enteric coated 81 milliGRAM(s) Oral daily  clopidogrel Tablet 75 milliGRAM(s) Oral daily  metoprolol 12.5 milliGRAM(s) Oral every 12 hours  lisinopril 2.5 milliGRAM(s) Oral daily  heparin  Infusion.  Unit(s)/Hr IV Continuous <Continuous>  heparin  Injectable 4000 Unit(s) IV Push every 6 hours PRN  heparin  Injectable 2000 Unit(s) IV Push every 6 hours PRN    levothyroxine 25 MICROGram(s) Oral daily  atorvastatin 40 milliGRAM(s) Oral at bedtime    REVIEW OF SYSTEMS:  Denies CP, SOB, LH, LOC. Not ambulating as of yet, awaiting PT.  Remaining 10point ROS negative.    PHYSICAL EXAM:  T(C): 37.4 (09-02-17 @ 04:13), Max: 37.4 (09-02-17 @ 04:13)  HR: 104 (09-02-17 @ 04:13) (104 - 105)  BP: 97/62 (09-02-17 @ 04:13) (97/62 - 102/69)  RR: 18 (09-02-17 @ 04:13) (18 - 18)  SpO2: 95% (09-02-17 @ 04:13) (92% - 96%)  Wt(kg): --  I&O's Summary    01 Sep 2017 07:01  -  02 Sep 2017 07:00  --------------------------------------------------------  IN: 1170 mL / OUT: 950 mL / NET: 220 mL      Appearance: Normal	  HEENT:   Normal oral mucosa, PERRL, EOMI	  Lymphatic: No lymphadenopathy  Cardiovascular: Normal S1 S2, JVD 8cm, upper LSP 2/6 systolic, No edema  Respiratory: rales lower lung fields.  Psychiatry: A & O x 2, Mood & affect appropriate  Gastrointestinal:  Soft, Non-tender, + BS	  Skin: No rashes, No ecchymoses, No cyanosis	  Neurologic: Non-focal  Extremities: Normal range of motion, No clubbing, cyanosis or edema  Vascular: Peripheral pulses palpable 2+ bilaterally    LABS:	 	    CBC Full  -  ( 01 Sep 2017 23:19 )  WBC Count : 8.4 K/uL  Hemoglobin : 12.9 g/dL  Hematocrit : 39.1 %  Platelet Count - Automated : 131 K/uL  Mean Cell Volume : 94.1 fl  Mean Cell Hemoglobin : 31.1 pg  Mean Cell Hemoglobin Concentration : 33.0 gm/dL  Auto Neutrophil # : x  Auto Lymphocyte # : x  Auto Monocyte # : x  Auto Eosinophil # : x  Auto Basophil # : x  Auto Neutrophil % : x  Auto Lymphocyte % : x  Auto Monocyte % : x  Auto Eosinophil % : x  Auto Basophil % : x    09-01    140  |  101  |  20  ----------------------------<  132<H>  3.9   |  25  |  0.92    Ca    8.8      01 Sep 2017 02:24  Phos  2.4     09-01  Mg     2.2     09-01    TPro  6.8  /  Alb  3.3  /  TBili  1.4<H>  /  DBili  x   /  AST  237<H>  /  ALT  44  /  AlkPhos  162<H>  09-01      proBNP: Serum Pro-Brain Natriuretic Peptide: 706 pg/mL (08-31 @ 06:06)    TELEMETRY: 	sinus      < from: TTE with Doppler (w/Cont) (09.01.17 @ 11:32) >  Conclusions:  1. Basal septal hypertrophy (measures 1.5 cm), otherise  normal left ventricular dimension.  2. Moderately reduced left ventricular systolic function  (segmental). The mid to distal inferoseptum, mid-anterior,  inferoapical, apicolateral, and apicoseptal walls are  akinetic.  Enodcardial visualization enhanced with  intravenous contrast (definity). Incomplete  filling/swirling in the apicolateral wall suggestive, but  not definitive of left ventricular thrombus. Suggest CT/MRI  for further evaluation.  3. Moderate tricuspid regurgitation.  4. Estimated pulmonary artery systolic pressure equals 42  mm Hg, assuming right atrial pressure equals 8 mm Hg,  consistent with mild pulmonary pressures.  *** Compared with echocardiogram of 8/31/2017, no  significant changes noted.    < end of copied text >    	  ASSESSMENT/PLAN: 	    82 year old woman w/ hx htn, dm2 (Diet Controlled), hypothyroidism, ?mild dementia, p/w L sided chest pain concerning for late presentation MI     - cont asa/plavix dapt for 12mo then asa indefinitely   - TTE above indeterminate for LV thrombus -> hep gtt -> definitive test cardiac MRI, d/c anticoagulation if negative otherwise convert to coumadin  - assessed by interventional and deemed poor candidate for revascularization  -HF-OMT - convert to metoprolol succinate 25mg daily, titrate lisinopril as BP tolerates (SBP <90), add spironolactone when able  **please add lisinopril 2.5mg daily  **please give lasix 20mg IV x1    [id number]

## 2017-09-02 NOTE — PROVIDER CONTACT NOTE (CRITICAL VALUE NOTIFICATION) - RECOMMENDATIONS
follow heparin Nomogram stop drip for 60min and restart at new rate 8cc/hr. will continue to monitor

## 2017-09-02 NOTE — PROGRESS NOTE ADULT - PROBLEM SELECTOR PROBLEM 3
Diabetes R/O Acute heart failure, unspecified heart failure type R/O NSTEMI (non-ST elevated myocardial infarction)

## 2017-09-02 NOTE — PROGRESS NOTE ADULT - PROBLEM SELECTOR PROBLEM 2
R/O Acute heart failure, unspecified heart failure type R/O NSTEMI (non-ST elevated myocardial infarction) Thrombus

## 2017-09-02 NOTE — PHYSICAL THERAPY INITIAL EVALUATION ADULT - CRITERIA FOR SKILLED THERAPEUTIC INTERVENTIONS
risk reduction/prevention/anticipated discharge recommendation/rehab potential/predicted duration of therapy intervention/anticipated equipment needs at discharge/impairments found/functional limitations in following categories/therapy frequency

## 2017-09-02 NOTE — PROGRESS NOTE ADULT - PROBLEM SELECTOR PLAN 1
Op Note    Ej Miller  [id number]  2/17/2017        Preoperative Diagnosis: Pain in both knees, unspecified chronicity [M25.561, M25.562]  Primary osteoarthritis of both knees [M17.0]  Primary osteoarthritis of both knees [M17.0]  Postoperative Diagnosis: same    Procedure Performed:   Procedure(s) (LRB):  REPLACEMENT-KNEE-TOTAL jose armando (Right)    Surgeons:  Surgeon(s) and Role:     * Wagner Ayala MD - Primary     * Erick Canchola MD - Resident - Assisting    Anesthesia Staff:  Anesthesiologist: Randy Carroll MD  CRNA: Bethany Silva CRNA    Anesthesia Type: neuraxial    Specimens:  Bone cuts    Estimated Blood Loss:  100 cc    Implants:    Implant Name Type Inv. Item Serial No.  Lot No. LRB No. Used   CEMENT BONE W/GENT SIMPLEX HV - [id number]  CEMENT BONE W/GENT SIMPLEX HV  [employer]. [id number] Right 2   BASEPLATE TIB TOTAL STAB SZ 5 - [id number]  BASEPLATE TIB TOTAL STAB SZ 5  [employer]. [id number] Right 1   FEMORAL POST STAB KATINA SZ 6 RT - [id number]  FEMORAL POST STAB KATINA SZ 6 RT  [employer]. [id number] Right 1   PATELLA TRI 33X9 X3 POLYETHYLE - [id number]  PATELLA TRI 33X9 X3 POLYETHYLE  [employer]. [id number] Right 1   Total Stabilizer Tibial Insert 16 TS       JOSE ARMANDO [id number] Right 1       Clinical History:  The patient is a 79 y.o. year-old male with knee degenerative joint disease who failed conservative measures and treatment. He wished to undergo a knee replacement electively. The patient at this time was explained the risks and benefits of the surgery including infection, bleeding, damage to neurovascular structures and potential for continued pain, continued weakness, worsening pain and worsening weakness, stiffness, stability, limb length discrepancy, fracture, infection requiring the resection arthroplasty and the need for revision surgery in the future, DVT prophylaxis and the risks for anesthetic complications and pulmonary complications  and even death. The patient understood at this point under the risks and wished to proceed.    Description of procedure:  The patient was seen in the preoperative holding area and the appropriate side lower extremity was marked. The patient was brought to the Operating Room and placed supine on the Operating Room table. A preliminary anesthesia time out was then taken.  The neuraxial anesthesia was initiated.  A tourniquet was placed high on the thigh, and then the operative side lower extremity was prepped and draped in the usual sterile manner.  The limb was exsanguinated, the knee was flexed beyond 90 degrees, and then the tourniquet was inflated to 250 mm Hg.    Using an anterior mini-incision, dissection was carried down to the extensor mechanism.  A medial parapatellar arthrotomy was performed with the deep knife.  A posteromedial soft tissue release was performed subperiosteally from the proximal tibia.  Some of the infra-patellar fat pad was excised.  The patella was subluxated laterally and the knee flexed to 70 degrees.    At this point retractors were inserted with thin bent Hohmann retractors placed anteriorly, medially and laterally.  The distal femur was visualized and Aldens line was marked.  The drill was placed into the intramedullary canal of the femur through the appropriate space near the notch.  Distal femoral cutting guide was inserted intramedullary and set to 5° of valgus.  The standard cut was taken using the saw on the distal femur after pinning the distal femoral cutting guide into place.    Attention was turned to the proximal tibial cut.  Osteophytes were removed from the medial proximal tibia, the cruciate ligaments were transected, and the tibia was subluxated anteriorly.   The tibial cutting guide was placed on the ACL footprint and pinned over the medial 1/3 of the tibial tubercle, following the anatomic axis of the tibia with the aid of the extra medullary guide..   The  resection level was set with the assistance of a feeler gauge, and the guide pinned in place.  This was then checked with the jose ramon wing.  The resection was made, and the cut bone removed.  The extension gap was now checked to ensure full extension with proper alignment.  Pins were then removed from the proximal tibia and distal femur.    Attention was then directed to preparation of the femur.   Retractors were inserted medially and laterally and the posterior referencing sizer was utilized. The femur was appropriately sized to a the patient's bone and the 4-in-1 cutting block was pinned in place.  The bone was resected with a sagittal saw and removed.      Laminar spreaders were placed and the medial and lateral meniscal remnants, posterior cruciate ligament, and posterior condylar osteophytes were all excised.  Flexion and extension gaps were assessed and then balanced with a spacer block.    The box was prepared by reaming and osteotomizing the bone.    The trial femoral component was placed on the cut surfaces of bone and placed in the desired medial/lateral position.       Attention was turned toward the tibial preparation.  The tibia was subluxated anteriorly and the tibia prepared to accept the tibial tray.  The punch was inserted to the appropriate position after checking rotation alignment.    The patella was partially everted and the undersurface of the patella resected. The patella was prepared to accept a three peg  patellar button.  With the trial button in place, the knee was taken through a full range of motion and the patella was observed to track midline without any liftoff.    All trial components were removed. The knee was pulse lavaged and the bony surfaces dried thoroughly.  Using hand-mixed cement, the implants were cemented into place, tibial tray followed by the femoral component.  After the removal of excess cement the polyethylene insert was seated and locked into the tibial tray.  The  knee was reduced and brought into full extension further pressurizing the cement.      At this point, the patellar implant was cemented into place and held with a patellar clamp.  After the cement was allowed to polymerize, the tourniquet was deflated.  Total tourniquet time was 65.  Wound was washed copiously and hemostasis achieved with bovie electrocautery. The medial parapatellar arthrotomy was repaired with interrupted 1 Ethibond sutures and a running 1 vicryl suture and subcutaneous tissue repaired with interrupted 0 and 3-0 Vicryl suture.  The skin was closed with Monocryl and Dermabond.  A sterile compressive dressing was applied.   All sponge, needle counts were correct x 2.  The patient was transferred to the recovery room in stable condition.     I was present for all critical portions of the procedure.        Post-operative Plan:  Patient will receive the appropriate physical therapy for mobilization and DVT prophylaxis.        Attestation Statement:  I was present for and performed all critical portions of the procedure.    Wagner Ayala     Date: 2/17/2017  Time: 12:07 PM         Concern for Late presentation MI vs NSTEMI as patient has q waves in V4 & V5 with positive troponin. Chest pain now resolved. Not candidate for cath due to underlying dementia.   - s/p ASA, Brilinta load and heparin.  - Continue ASA and plavix  - Indeterminant TTE with definity contrast  - Pending MRI to r/o LV thrombus Likely 2/2 relative hypotension (from underlying MI and medications). Hemoglobin stable on heparin without evidence of bleeding.   - D/c lisinopril  - Holding lasix for now as patient appears relatively euvolemic  - Continue BID metoprolol until BP improves (then switch to succinate) Likely 2/2 relative hypotension (from underlying MI and medications). Hemoglobin stable on heparin without evidence of bleeding.   - D/c lisinopril for now  - Holding lasix for now as recent hypotension and symptomatic  - Continue BID metoprolol until BP improves (then switch to succinate)

## 2017-09-02 NOTE — PROGRESS NOTE ADULT - ATTENDING COMMENTS
Patient seen and examined; agree with Cardiology Fellow assessment and plan.  --Patient feels well, denies any significant complaints.  --Late presentation MI--medically managed CAD.  --On Aspirin and Plavix as well as heparin.  --TTE with evidence of swirling contrast in LV apex/cannot rule out thrombus.  --Agree with Cardiac MR to evaluate for LV thrombus.  --Patient says she had a cardiac angiogram recently at another institution--would attempt to obtain records.  --Optimize ACE-I and beta blocker when BP can tolerate.

## 2017-09-02 NOTE — PROGRESS NOTE ADULT - PROBLEM SELECTOR PLAN 6
Hep SQ ppx Heparin gtt    Suri Holland MD  Internal Medicine, Intern  Pager [phone] c/w Synthroid 25mcg. TSH appropriate

## 2017-09-02 NOTE — PROGRESS NOTE ADULT - PROBLEM SELECTOR PLAN 4
Well controlled on lisinopril 2.5 mg and metoprolol. - Patient is diet controlled. HgA1c 5.6%. Well controlled. Bedside TTE at CCU with moderately reduced left ventricular systolic function  and segmental wall motion abnormalities. The mid anterior  septum, the apical inferior wall, the apical anterior wall,  the apical septum, the apical lateral wall, the mid  anterior wall, and the mid inferoseptum are akinetic.    - Had rales on exam, however no other signs of fluid overload.  - Consider diuresis with 20 mg daily PO lasix  - Monitor Ins & Outs.  - Continue BB as BP tolerates. Add back ace-inh if BP tolerates.  - Switch to metoprolol succinate if BP tolerates

## 2017-09-02 NOTE — PROGRESS NOTE ADULT - ASSESSMENT
81 yo mildy demented (AxO2), T2DM, hypothyroidism p/w fall and L sided chest pain p/w late presentation MI 81 yo mildy demented (AxO2), T2DM, hypothyroidism p/w fall and L sided chest pain p/w late presentation MI medically managing. Hosp course c/b ?LV thrombus on hep gtt.

## 2017-09-02 NOTE — PHYSICAL THERAPY INITIAL EVALUATION ADULT - ADDITIONAL COMMENTS
82 year old female who PTA was living in  with spouse ( is in rehab due to recent amputation). House has 4 steps to enter + hand rails. has addititonal flight of stairs (8 or so) to 2nd floor bedroom + hand rail, however pt states she could stay on first floor if needed. PTA pt was independent with all functional mobility and all ADL;s states no AD for gait , taking care of herself.

## 2017-09-02 NOTE — PHYSICAL THERAPY INITIAL EVALUATION ADULT - PLANNED THERAPY INTERVENTIONS, PT EVAL
strengthening/stair training./gait training/transfer training/balance training/bed mobility training

## 2017-09-02 NOTE — PROGRESS NOTE ADULT - SUBJECTIVE AND OBJECTIVE BOX
Patient is a 82y old  Female who presents with a chief complaint of Chest Pain (01 Sep 2017 13:14)      SUBJECTIVE / OVERNIGHT EVENTS:  No overnight events.     MEDICATIONS  (STANDING):  aspirin enteric coated 81 milliGRAM(s) Oral daily  levothyroxine 25 MICROGram(s) Oral daily  atorvastatin 40 milliGRAM(s) Oral at bedtime  clopidogrel Tablet 75 milliGRAM(s) Oral daily  metoprolol 12.5 milliGRAM(s) Oral every 12 hours  lisinopril 2.5 milliGRAM(s) Oral daily  heparin  Infusion.  Unit(s)/Hr (10 mL/Hr) IV Continuous <Continuous>    MEDICATIONS  (PRN):  heparin  Injectable 4000 Unit(s) IV Push every 6 hours PRN For aPTT less than 40  heparin  Injectable 2000 Unit(s) IV Push every 6 hours PRN For aPTT between 40 - 57      CAPILLARY BLOOD GLUCOSE  154 (01 Sep 2017 12:00)  120 (01 Sep 2017 07:27)    Vital Signs Last 24 Hrs  T(C): 37.4 (02 Sep 2017 04:13), Max: 37.4 (02 Sep 2017 04:13)  T(F): 99.4 (02 Sep 2017 04:13), Max: 99.4 (02 Sep 2017 04:13)  HR: 104 (02 Sep 2017 04:13) (104 - 105)  BP: 97/62 (02 Sep 2017 04:13) (97/62 - 102/69)  BP(mean): --  RR: 18 (02 Sep 2017 04:13) (18 - 18)  SpO2: 95% (02 Sep 2017 04:13) (92% - 96%)    I&O's Summary    31 Aug 2017 07:01  -  01 Sep 2017 07:00  --------------------------------------------------------  IN: 15 mL / OUT: 2100 mL / NET: -2085 mL    01 Sep 2017 07:01  -  02 Sep 2017 06:55  --------------------------------------------------------  IN: 1074 mL / OUT: 500 mL / NET: 574 mL        PHYSICAL EXAM:  GENERAL: NAD, well-developed  HEAD:  Atraumatic, Normocephalic  EYES:  NECK:   CHEST/LUNG: Clear to auscultation bilaterally; No wheezes, rhonchi or gallops  HEART: Regular rate and rhythm; No murmurs, rubs, or gallops  ABDOMEN: Soft, Nontender, Nondistended; Bowel sounds present  EXTREMITIES:  2+ Peripheral Pulses, No clubbing, cyanosis, or edema  PSYCH: Appropriate  NEUROLOGY: non-focal  SKIN: No rashes or lesions    LABS:                        12.9   8.4   )-----------( 131      ( 01 Sep 2017 23:19 )             39.1     09-01    140  |  101  |  20  ----------------------------<  132<H>  3.9   |  25  |  0.92    Ca    8.8      01 Sep 2017 02:24  Phos  2.4     09-01  Mg     2.2     09-01    TPro  6.8  /  Alb  3.3  /  TBili  1.4<H>  /  DBili  x   /  AST  237<H>  /  ALT  44  /  AlkPhos  162<H>  09-01    PT/INR - ( 31 Aug 2017 11:42 )   PT: 12.2 sec;   INR: 1.13 ratio         PTT - ( 01 Sep 2017 23:19 )  PTT:154.0 sec  CARDIAC MARKERS ( 01 Sep 2017 02:24 )  x     / 6.10 ng/mL / 1981 U/L / x     / 47.2 ng/mL  CARDIAC MARKERS ( 31 Aug 2017 18:57 )  x     / 6.36 ng/mL / 2788 U/L / x     / 132.9 ng/mL  CARDIAC MARKERS ( 31 Aug 2017 11:42 )  x     / 7.45 ng/mL / x     / x     / 279.1 ng/mL          RADIOLOGY & ADDITIONAL TESTS:    Imaging Personally Reviewed:    Consultant(s) Notes Reviewed:      Care Discussed with Consultants/Other Providers: Patient is a 82y old  Female who presents with a chief complaint of Chest Pain (01 Sep 2017 13:14)      SUBJECTIVE / OVERNIGHT EVENTS:  No overnight events. No events on telemetry. Feels woozy this morning. SBP 90s at bedside with automatic cuff. No chest pain, shortness of breath or palpitations. No fevers or chills. Denies blood in stool (hemoglobin stable this AM).     MEDICATIONS  (STANDING):  aspirin enteric coated 81 milliGRAM(s) Oral daily  levothyroxine 25 MICROGram(s) Oral daily  atorvastatin 40 milliGRAM(s) Oral at bedtime  clopidogrel Tablet 75 milliGRAM(s) Oral daily  metoprolol 12.5 milliGRAM(s) Oral every 12 hours  lisinopril 2.5 milliGRAM(s) Oral daily  heparin  Infusion.  Unit(s)/Hr (10 mL/Hr) IV Continuous <Continuous>    MEDICATIONS  (PRN):  heparin  Injectable 4000 Unit(s) IV Push every 6 hours PRN For aPTT less than 40  heparin  Injectable 2000 Unit(s) IV Push every 6 hours PRN For aPTT between 40 - 57      CAPILLARY BLOOD GLUCOSE  154 (01 Sep 2017 12:00)  120 (01 Sep 2017 07:27)    Vital Signs Last 24 Hrs  T(C): 37.4 (02 Sep 2017 04:13), Max: 37.4 (02 Sep 2017 04:13)  T(F): 99.4 (02 Sep 2017 04:13), Max: 99.4 (02 Sep 2017 04:13)  HR: 104 (02 Sep 2017 04:13) (104 - 105)  BP: 97/62 (02 Sep 2017 04:13) (97/62 - 102/69)  BP(mean): --  RR: 18 (02 Sep 2017 04:13) (18 - 18)  SpO2: 95% (02 Sep 2017 04:13) (92% - 96%)    I&O's Summary    31 Aug 2017 07:01  -  01 Sep 2017 07:00  --------------------------------------------------------  IN: 15 mL / OUT: 2100 mL / NET: -2085 mL    01 Sep 2017 07:01  -  02 Sep 2017 06:55  --------------------------------------------------------  IN: 1074 mL / OUT: 500 mL / NET: 574 mL        PHYSICAL EXAM:  GENERAL: NAD, well-developed  HEAD:  Atraumatic, Normocephalic  CHEST/LUNG: Mild rales posteriorly, otherwise clear to auscultation bilaterally  HEART: Regular rate and rhythm  ABDOMEN: Soft, Nontender, Nondistended; Bowel sounds present  EXTREMITIES:  No edema  PSYCH: Appropriately answering questions, at baseline  NEUROLOGY: non-focal  SKIN: No rashes or lesions    LABS:                        12.9   8.4   )-----------( 131      ( 01 Sep 2017 23:19 )             39.1     09-01    140  |  101  |  20  ----------------------------<  132<H>  3.9   |  25  |  0.92    Ca    8.8      01 Sep 2017 02:24  Phos  2.4     09-01  Mg     2.2     09-01    TPro  6.8  /  Alb  3.3  /  TBili  1.4<H>  /  DBili  x   /  AST  237<H>  /  ALT  44  /  AlkPhos  162<H>  09-01    PT/INR - ( 31 Aug 2017 11:42 )   PT: 12.2 sec;   INR: 1.13 ratio         PTT - ( 01 Sep 2017 23:19 )  PTT:154.0 sec  CARDIAC MARKERS ( 01 Sep 2017 02:24 )  x     / 6.10 ng/mL / 1981 U/L / x     / 47.2 ng/mL  CARDIAC MARKERS ( 31 Aug 2017 18:57 )  x     / 6.36 ng/mL / 2788 U/L / x     / 132.9 ng/mL  CARDIAC MARKERS ( 31 Aug 2017 11:42 )  x     / 7.45 ng/mL / x     / x     / 279.1 ng/mL Patient is a 82y old  Female who presents with a chief complaint of Chest Pain (01 Sep 2017 13:14)      SUBJECTIVE / OVERNIGHT EVENTS:  No overnight events. No events on telemetry. Feels woozy this morning. SBP 90s at bedside with automatic cuff. No chest pain, shortness of breath or palpitations. No fevers or chills. Denies blood in stool (hemoglobin stable this AM).     MEDICATIONS  (STANDING):  aspirin enteric coated 81 milliGRAM(s) Oral daily  levothyroxine 25 MICROGram(s) Oral daily  atorvastatin 40 milliGRAM(s) Oral at bedtime  clopidogrel Tablet 75 milliGRAM(s) Oral daily  metoprolol 12.5 milliGRAM(s) Oral every 12 hours  heparin  Infusion.  Unit(s)/Hr (10 mL/Hr) IV Continuous <Continuous>  furosemide   Injectable 20 milliGRAM(s) IV Push once  lisinopril 2.5 milliGRAM(s) Oral daily    MEDICATIONS  (PRN):  heparin  Injectable 4000 Unit(s) IV Push every 6 hours PRN For aPTT less than 40  heparin  Injectable 2000 Unit(s) IV Push every 6 hours PRN For aPTT between 40 - 57      CAPILLARY BLOOD GLUCOSE  154 (01 Sep 2017 12:00)  120 (01 Sep 2017 07:27)    Vital Signs Last 24 Hrs  T(C): 37.4 (02 Sep 2017 04:13), Max: 37.4 (02 Sep 2017 04:13)  T(F): 99.4 (02 Sep 2017 04:13), Max: 99.4 (02 Sep 2017 04:13)  HR: 104 (02 Sep 2017 04:13) (104 - 105)  BP: 97/62 (02 Sep 2017 04:13) (97/62 - 102/69)  BP(mean): --  RR: 18 (02 Sep 2017 04:13) (18 - 18)  SpO2: 95% (02 Sep 2017 04:13) (92% - 96%)    I&O's Summary    31 Aug 2017 07:01  -  01 Sep 2017 07:00  --------------------------------------------------------  IN: 15 mL / OUT: 2100 mL / NET: -2085 mL    01 Sep 2017 07:01  -  02 Sep 2017 06:55  --------------------------------------------------------  IN: 1074 mL / OUT: 500 mL / NET: 574 mL        PHYSICAL EXAM:  GENERAL: NAD, well-developed  HEAD:  Atraumatic, Normocephalic  CHEST/LUNG: Mild rales posteriorly, otherwise clear to auscultation bilaterally  HEART: tachycardic, reg rhythm, no m/r/g.  ABDOMEN: Soft, Nontender, Nondistended; Bowel sounds present  EXTREMITIES:  No edema  PSYCH: Appropriately answering questions, at baseline  NEUROLOGY: non-focal  SKIN: No rashes or lesions    LABS:                         12.7   7.78  )-----------( 159      ( 02 Sep 2017 15:21 )             39.7     09-02    138  |  101  |  18  ----------------------------<  121<H>  4.4   |  22  |  0.99    Ca    9.2      02 Sep 2017 15:21  Phos  2.7     09-02  Mg     2.1     09-02    TPro  7.4  /  Alb  3.4  /  TBili  1.9<H>  /  DBili  x   /  AST  177<H>  /  ALT  70<H>  /  AlkPhos  176<H>  09-02    PTT - ( 02 Sep 2017 18:03 )  PTT:47.4 sec    CARDIAC MARKERS ( 01 Sep 2017 02:24 )  x     / 6.10 ng/mL / 1981 U/L / x     / 47.2 ng/mL    Labs reviewed remarkable for elev LFTs.

## 2017-09-02 NOTE — PROGRESS NOTE ADULT - PROBLEM SELECTOR PLAN 2
Bedside TTE at CCU with moderatly reduced left ventricular systolic function  and segmental wall motion abnormalaties. The mid anterior  septum, the apical inferior wall, the apical anterior wall,  the apical septum, the apical lateral wall, the mid  anterior wall, and the mid inferoseptum are akinetic.    - Had rales on exam, however no other signs of fluid overload.  - Consider diuresis with 20 mg daily PO lasix  - Monitor Ins & Outs.  - Continue BB & Ace-I as BP tolerates  - Switch to metoprolol succinate if BP tolerates Concern for Late presentation MI vs NSTEMI as patient has q waves in V4 & V5 with positive troponin. Chest pain now resolved. Not candidate for cath due to underlying dementia.   - s/p ASA, Brilinta load and heparin.  - Continue ASA and plavix  - Indeterminant TTE with definity contrast  - Pending MRI to r/o LV thrombus Concern for Late presentation MI vs NSTEMI as patient has q waves in V4 & V5 with positive troponin. Chest pain now resolved. Not candidate for cath due to underlying dementia.   - s/p ASA, Brilinta load and heparin.  - Continue ASA and plavix  - Indeterminant TTE with definity contrast  - Pending MRI to r/o LV thrombus  - Appreciate cardiology f/u and note ?LV thrombus on TTE inpt.  - f/u MRI cardiac for closer visualization  - tx w hep gtt. monitor PTT q6h closely.

## 2017-09-02 NOTE — PHYSICAL THERAPY INITIAL EVALUATION ADULT - PERTINENT HX OF CURRENT PROBLEM, REHAB EVAL
82 year old woman w/ hx htn, dm2 (Diet Controlled), hypothyroidism, mild dementia, p/w L sided chest pain concerning for Late Presentation MI no intervention. Optimizing medical therapy. MEJIA: concerning for Thrombus, Pt put on heparin.

## 2017-09-03 DIAGNOSIS — R00.0 TACHYCARDIA, UNSPECIFIED: ICD-10-CM

## 2017-09-03 LAB
ALBUMIN SERPL ELPH-MCNC: 3.3 G/DL — SIGNIFICANT CHANGE UP (ref 3.3–5)
ALP SERPL-CCNC: 189 U/L — HIGH (ref 40–120)
ALT FLD-CCNC: 70 U/L — HIGH (ref 10–45)
ANION GAP SERPL CALC-SCNC: 17 MMOL/L — SIGNIFICANT CHANGE UP (ref 5–17)
APTT BLD: 103.9 SEC — HIGH (ref 27.5–37.4)
APTT BLD: 59.1 SEC — HIGH (ref 27.5–37.4)
APTT BLD: 86.7 SEC — HIGH (ref 27.5–37.4)
AST SERPL-CCNC: 111 U/L — HIGH (ref 10–40)
BASOPHILS # BLD AUTO: 0.03 K/UL — SIGNIFICANT CHANGE UP (ref 0–0.2)
BASOPHILS NFR BLD AUTO: 0.5 % — SIGNIFICANT CHANGE UP (ref 0–2)
BILIRUB SERPL-MCNC: 1.8 MG/DL — HIGH (ref 0.2–1.2)
BUN SERPL-MCNC: 16 MG/DL — SIGNIFICANT CHANGE UP (ref 7–23)
CALCIUM SERPL-MCNC: 9 MG/DL — SIGNIFICANT CHANGE UP (ref 8.4–10.5)
CHLORIDE SERPL-SCNC: 101 MMOL/L — SIGNIFICANT CHANGE UP (ref 96–108)
CO2 SERPL-SCNC: 21 MMOL/L — LOW (ref 22–31)
CREAT SERPL-MCNC: 0.97 MG/DL — SIGNIFICANT CHANGE UP (ref 0.5–1.3)
EOSINOPHIL # BLD AUTO: 0.08 K/UL — SIGNIFICANT CHANGE UP (ref 0–0.5)
EOSINOPHIL NFR BLD AUTO: 1.2 % — SIGNIFICANT CHANGE UP (ref 0–6)
GLUCOSE SERPL-MCNC: 117 MG/DL — HIGH (ref 70–99)
HCT VFR BLD CALC: 37 % — SIGNIFICANT CHANGE UP (ref 34.5–45)
HGB BLD-MCNC: 11.8 G/DL — SIGNIFICANT CHANGE UP (ref 11.5–15.5)
IMM GRANULOCYTES NFR BLD AUTO: 0.3 % — SIGNIFICANT CHANGE UP (ref 0–1.5)
INR BLD: 1.1 RATIO — SIGNIFICANT CHANGE UP (ref 0.88–1.16)
LYMPHOCYTES # BLD AUTO: 2.07 K/UL — SIGNIFICANT CHANGE UP (ref 1–3.3)
LYMPHOCYTES # BLD AUTO: 31.4 % — SIGNIFICANT CHANGE UP (ref 13–44)
MAGNESIUM SERPL-MCNC: 2 MG/DL — SIGNIFICANT CHANGE UP (ref 1.6–2.6)
MCHC RBC-ENTMCNC: 28.3 PG — SIGNIFICANT CHANGE UP (ref 27–34)
MCHC RBC-ENTMCNC: 31.9 GM/DL — LOW (ref 32–36)
MCV RBC AUTO: 88.7 FL — SIGNIFICANT CHANGE UP (ref 80–100)
MONOCYTES # BLD AUTO: 0.92 K/UL — HIGH (ref 0–0.9)
MONOCYTES NFR BLD AUTO: 14 % — SIGNIFICANT CHANGE UP (ref 2–14)
NEUTROPHILS # BLD AUTO: 3.47 K/UL — SIGNIFICANT CHANGE UP (ref 1.8–7.4)
NEUTROPHILS NFR BLD AUTO: 52.6 % — SIGNIFICANT CHANGE UP (ref 43–77)
PHOSPHATE SERPL-MCNC: 3.1 MG/DL — SIGNIFICANT CHANGE UP (ref 2.5–4.5)
PLATELET # BLD AUTO: 181 K/UL — SIGNIFICANT CHANGE UP (ref 150–400)
POTASSIUM SERPL-MCNC: 4.2 MMOL/L — SIGNIFICANT CHANGE UP (ref 3.5–5.3)
POTASSIUM SERPL-SCNC: 4.2 MMOL/L — SIGNIFICANT CHANGE UP (ref 3.5–5.3)
PROT SERPL-MCNC: 7.1 G/DL — SIGNIFICANT CHANGE UP (ref 6–8.3)
PROTHROM AB SERPL-ACNC: 12.5 SEC — SIGNIFICANT CHANGE UP (ref 10–13.1)
RBC # BLD: 4.17 M/UL — SIGNIFICANT CHANGE UP (ref 3.8–5.2)
RBC # FLD: 13.4 % — SIGNIFICANT CHANGE UP (ref 10.3–14.5)
SODIUM SERPL-SCNC: 139 MMOL/L — SIGNIFICANT CHANGE UP (ref 135–145)
WBC # BLD: 6.59 K/UL — SIGNIFICANT CHANGE UP (ref 3.8–10.5)
WBC # FLD AUTO: 6.59 K/UL — SIGNIFICANT CHANGE UP (ref 3.8–10.5)

## 2017-09-03 PROCEDURE — 99233 SBSQ HOSP IP/OBS HIGH 50: CPT | Mod: GC

## 2017-09-03 RX ORDER — LISINOPRIL 2.5 MG/1
2.5 TABLET ORAL DAILY
Qty: 0 | Refills: 0 | Status: DISCONTINUED | OUTPATIENT
Start: 2017-09-03 | End: 2017-09-05

## 2017-09-03 RX ADMIN — Medication 100 MILLIGRAM(S): at 21:18

## 2017-09-03 RX ADMIN — Medication 12.5 MILLIGRAM(S): at 17:47

## 2017-09-03 RX ADMIN — HEPARIN SODIUM 700 UNIT(S)/HR: 5000 INJECTION INTRAVENOUS; SUBCUTANEOUS at 18:00

## 2017-09-03 RX ADMIN — CLOPIDOGREL BISULFATE 75 MILLIGRAM(S): 75 TABLET, FILM COATED ORAL at 11:28

## 2017-09-03 RX ADMIN — Medication 12.5 MILLIGRAM(S): at 06:24

## 2017-09-03 RX ADMIN — Medication 81 MILLIGRAM(S): at 11:28

## 2017-09-03 RX ADMIN — ATORVASTATIN CALCIUM 40 MILLIGRAM(S): 80 TABLET, FILM COATED ORAL at 21:18

## 2017-09-03 RX ADMIN — HEPARIN SODIUM 700 UNIT(S)/HR: 5000 INJECTION INTRAVENOUS; SUBCUTANEOUS at 01:23

## 2017-09-03 RX ADMIN — LISINOPRIL 2.5 MILLIGRAM(S): 2.5 TABLET ORAL at 11:28

## 2017-09-03 RX ADMIN — HEPARIN SODIUM 700 UNIT(S)/HR: 5000 INJECTION INTRAVENOUS; SUBCUTANEOUS at 10:22

## 2017-09-03 NOTE — PROGRESS NOTE ADULT - ASSESSMENT
81 yo mildy demented (AxO2), T2DM, hypothyroidism p/w fall and L sided chest pain p/w late presentation MI medically managing. Hosp course c/b ?LV thrombus on hep gtt.

## 2017-09-03 NOTE — PROGRESS NOTE ADULT - PROBLEM SELECTOR PROBLEM 4
R/O Acute heart failure, unspecified heart failure type R/O NSTEMI (non-ST elevated myocardial infarction)

## 2017-09-03 NOTE — PROGRESS NOTE ADULT - PROBLEM SELECTOR PLAN 3
Concern for Late presentation MI vs NSTEMI as patient has q waves in V4 & V5 with positive troponin. Chest pain now resolved. Not candidate for cath due to underlying dementia.   - s/p ASA, Brilinta load and heparin.  - Continue ASA and plavix  - Indeterminant TTE with definity contrast  - Pending MRI to r/o LV thrombus ?LV thrombus on TTE inpt.  - f/u MRI cardiac for closer visualization  - tx w hep gtt. monitor PTT q6h closely.  - obtain cardiac records of prior [facility]

## 2017-09-03 NOTE — PROGRESS NOTE ADULT - PROBLEM SELECTOR PLAN 4
Bedside TTE at CCU with moderately reduced left ventricular systolic function  and segmental wall motion abnormalities. The mid anterior  septum, the apical inferior wall, the apical anterior wall,  the apical septum, the apical lateral wall, the mid  anterior wall, and the mid inferoseptum are akinetic.    - Had rales on exam, however no other signs of fluid overload.  - Consider diuresis with 20 mg daily PO lasix  - Monitor Ins & Outs.  - Continue BB as BP tolerates. added back lisinopril  - Switch to metoprolol succinate if BP tolerates Concern for Late presentation MI vs NSTEMI as patient has q waves in V4 & V5 with positive troponin. Chest pain now resolved. Poor candidate for cath per interventional cardiology.  - s/p ASA, Brilinta load and heparin.  - Continue ASA and plavix  - started on small dose lisinopril, monitor bp  - Pending MRI to r/o LV thrombus

## 2017-09-03 NOTE — PROGRESS NOTE ADULT - PROBLEM SELECTOR PLAN 2
?LV thrombus on TTE inpt.  - f/u MRI cardiac for closer visualization  - tx w hep gtt. monitor PTT q6h closely. likely related to above dizziness, and dehydration  - encourage PO intake and monitor  - hold on diuresis

## 2017-09-03 NOTE — PROGRESS NOTE ADULT - PROBLEM SELECTOR PLAN 1
Likely 2/2 relative hypotension (from underlying MI and medications). Hemoglobin stable on heparin without evidence of bleeding.   - restarted low dose lisinopril 2.5 as per cardio recs.  - Holding lasix for now as recent hypotension and symptomatic  - Continue BID metoprolol until BP improves (then switch to succinate) Likely 2/2 relative hypotension (from underlying MI and dehydration). Hemoglobin stable on heparin without evidence of bleeding.   - encourage PO intake  - Holding lasix for now as recent hypotension and symptomatic  - Continue BID metoprolol until BP improves (then switch to succinate)

## 2017-09-03 NOTE — PROGRESS NOTE ADULT - PROBLEM SELECTOR PLAN 5
- Patient is diet controlled. HgA1c 5.6%. Well controlled. Bedside TTE at CCU with moderately reduced left ventricular systolic function  and segmental wall motion abnormalities. The mid anterior  septum, the apical inferior wall, the apical anterior wall,  the apical septum, the apical lateral wall, the mid  anterior wall, and the mid inferoseptum are akinetic.    - Had rales on exam, however no other signs of fluid overload.  - Hold diuresis given hypotension and dizziness  - Monitor Ins & Outs.  - Continue BB as BP tolerates. added back lisinopril  - Switch to metoprolol succinate if BP tolerates

## 2017-09-03 NOTE — PROGRESS NOTE ADULT - ATTENDING COMMENTS
Agree with Cardiology Fellow assessment and plan.  --Feels well with no further episodes of chest pain.  --Optimize CAD medical management with ACEi and long-acting beta blocker.  --Check cardiac MRI to rule out apical thrombus.  --Will follow. Agree with Cardiology Fellow assessment and plan.  --Feels well with no further episodes of chest pain.  --Attempt to retrieve any prior angiogram information.  --Optimize CAD medical management with ACEi and long-acting beta blocker.  --Check cardiac MRI to rule out apical thrombus.  --Will follow.

## 2017-09-03 NOTE — PROGRESS NOTE ADULT - SUBJECTIVE AND OBJECTIVE BOX
24H hour events: No acute events.     MEDICATIONS:  aspirin enteric coated 81 milliGRAM(s) Oral daily  clopidogrel Tablet 75 milliGRAM(s) Oral daily  metoprolol 12.5 milliGRAM(s) Oral every 12 hours  heparin  Infusion.  Unit(s)/Hr IV Continuous <Continuous>  heparin  Injectable 4000 Unit(s) IV Push every 6 hours PRN  heparin  Injectable 2000 Unit(s) IV Push every 6 hours PRN      levothyroxine 25 MICROGram(s) Oral daily  atorvastatin 40 milliGRAM(s) Oral at bedtime      REVIEW OF SYSTEMS:  Denies CP, SOB, abd pain, LH, palpitations. Ambulating to bathroom without difficulty.  Remaining 10point ROS negative.    PHYSICAL EXAM:  T(C): 36.8 (09-03-17 @ 05:09), Max: 36.9 (09-02-17 @ 20:00)  HR: 91 (09-03-17 @ 05:09) (91 - 117)  BP: 99/64 (09-03-17 @ 05:09) (96/67 - 118/73)  RR: 16 (09-03-17 @ 05:09) (16 - 18)  SpO2: 93% (09-03-17 @ 05:09) (93% - 96%)  Wt(kg): --  I&O's Summary    02 Sep 2017 07:01  -  03 Sep 2017 07:00  --------------------------------------------------------  IN: 874 mL / OUT: 650 mL / NET: 224 mL    Appearance: Normal	  HEENT:   Normal oral mucosa, PERRL, EOMI	  Lymphatic: No lymphadenopathy  Cardiovascular: Normal S1 S2, no JVD, upper LSP 2/6 systolic, No edema  Respiratory: rales lower lung fields.  Psychiatry: A & O x 2, Mood & affect appropriate  Gastrointestinal:  Soft, Non-tender, + BS	  Skin: No rashes, No ecchymoses, No cyanosis	  Neurologic: Non-focal  Extremities: Normal range of motion, No clubbing, cyanosis or edema  Vascular: Peripheral pulses palpable 2+ bilaterally    LABS:	 	    CBC Full  -  ( 02 Sep 2017 15:21 )  WBC Count : 7.78 K/uL  Hemoglobin : 12.7 g/dL  Hematocrit : 39.7 %  Platelet Count - Automated : 159 K/uL  Mean Cell Volume : 91.5 fl  Mean Cell Hemoglobin : 29.3 pg  Mean Cell Hemoglobin Concentration : 32.0 gm/dL  Auto Neutrophil # : x  Auto Lymphocyte # : x  Auto Monocyte # : x  Auto Eosinophil # : x  Auto Basophil # : x  Auto Neutrophil % : x  Auto Lymphocyte % : x  Auto Monocyte % : x  Auto Eosinophil % : x  Auto Basophil % : x    09-02    138  |  101  |  18  ----------------------------<  121<H>  4.4   |  22  |  0.99    Ca    9.2      02 Sep 2017 15:21  Phos  2.7     09-02  Mg     2.1     09-02    TPro  7.4  /  Alb  3.4  /  TBili  1.9<H>  /  DBili  x   /  AST  177<H>  /  ALT  70<H>  /  AlkPhos  176<H>  09-02    proBNP: Serum Pro-Brain Natriuretic Peptide: 706 pg/mL (08-31 @ 06:06)      TELEMETRY: 	sinus 80-90s    < from: TTE with Doppler (w/Cont) (09.01.17 @ 11:32) >  Conclusions:  1. Basal septal hypertrophy (measures 1.5 cm), otherise  normal left ventricular dimension.  2. Moderately reduced left ventricular systolic function  (segmental). The mid to distal inferoseptum, mid-anterior,  inferoapical, apicolateral, and apicoseptal walls are  akinetic.  Enodcardial visualization enhanced with  intravenous contrast (definity). Incomplete  filling/swirling in the apicolateral wall suggestive, but  not definitive of left ventricular thrombus. Suggest CT/MRI  for further evaluation.  3. Moderate tricuspid regurgitation.  4. Estimated pulmonary artery systolic pressure equals 42  mm Hg, assuming right atrial pressure equals 8 mm Hg,  consistent with mild pulmonary pressures.  *** Compared with echocardiogram of 8/31/2017, no  significant changes noted.    < end of copied text >    	  ASSESSMENT/PLAN: 	    82 year old woman w/ hx htn, dm2 (Diet Controlled), hypothyroidism, ?mild dementia, p/w L sided chest pain concerning for late presentation MI     - cont asa/plavix dapt for 12mo then asa indefinitely   - TTE above indeterminate for LV thrombus -> hep gtt -> definitive test cardiac MRI, d/c anticoagulation if negative otherwise convert to coumadin  - assessed by interventional and deemed poor candidate for revascularization  **please obtain records prior cardiac catheterization  -HF-OMT - convert to metoprolol succinate 25mg daily, titrate lisinopril as BP tolerates (SBP <90), add spironolactone when able  **please add lisinopril 2.5mg daily, hold SBP <90     [id number]

## 2017-09-03 NOTE — PROGRESS NOTE ADULT - SUBJECTIVE AND OBJECTIVE BOX
Patient is a 82y old  Female who presents with a chief complaint of Chest Pain (01 Sep 2017 13:14)      SUBJECTIVE / OVERNIGHT EVENTS:  No overnight events. Patient denies any CP, SOB. eating well. no complaints.    MEDICATIONS  (STANDING):  aspirin enteric coated 81 milliGRAM(s) Oral daily  levothyroxine 25 MICROGram(s) Oral daily  atorvastatin 40 milliGRAM(s) Oral at bedtime  clopidogrel Tablet 75 milliGRAM(s) Oral daily  metoprolol 12.5 milliGRAM(s) Oral every 12 hours  heparin  Infusion.  Unit(s)/Hr (10 mL/Hr) IV Continuous <Continuous>    MEDICATIONS  (PRN):  heparin  Injectable 4000 Unit(s) IV Push every 6 hours PRN For aPTT less than 40  heparin  Injectable 2000 Unit(s) IV Push every 6 hours PRN For aPTT between 40 - 57    Vital Signs Last 24 Hrs  T(C): 36.8 (03 Sep 2017 05:09), Max: 36.9 (02 Sep 2017 20:00)  T(F): 98.3 (03 Sep 2017 05:09), Max: 98.4 (02 Sep 2017 20:00)  HR: 91 (03 Sep 2017 05:09) (91 - 117)  BP: 99/64 (03 Sep 2017 05:09) (96/67 - 118/73)  BP(mean): --  RR: 16 (03 Sep 2017 05:09) (16 - 18)  SpO2: 93% (03 Sep 2017 05:09) (93% - 96%)    PHYSICAL EXAM:  GENERAL: NAD, well-developed  HEAD:  Atraumatic, Normocephalic  CHEST/LUNG: Mild rales posteriorly, otherwise clear to auscultation bilaterally  HEART: tachycardic, reg rhythm, no m/r/g.  ABDOMEN: Soft, Nontender, Nondistended; Bowel sounds present  EXTREMITIES:  No edema  PSYCH: Appropriately answering questions, at baseline  NEUROLOGY: non-focal  SKIN: No rashes or lesions    LABS:                  09-02    138  |  101  |  18  ----------------------------<  121<H>  4.4   |  22  |  0.99  09-01    140  |  101  |  20  ----------------------------<  132<H>  3.9   |  25  |  0.92    Ca    9.2      02 Sep 2017 15:21  Ca    8.8      01 Sep 2017 02:24  Phos  2.7     09-02  Mg     2.1     09-02    TPro  7.4  /  Alb  3.4  /  TBili  1.9<H>  /  DBili  x   /  AST  177<H>  /  ALT  70<H>  /  AlkPhos  176<H>  09-02  TPro  6.8  /  Alb  3.3  /  TBili  1.4<H>  /  DBili  x   /  AST  237<H>  /  ALT  44  /  AlkPhos  162<H>  09-01      PT/INR - ( 03 Sep 2017 09:18 )   PT: 12.5 sec;   INR: 1.10 ratio         PTT - ( 03 Sep 2017 08:34 )  PTT:86.7 sec                                        11.8   6.59  )-----------( 181      ( 03 Sep 2017 09:18 )             37.0                         12.7   7.78  )-----------( 159      ( 02 Sep 2017 15:21 )             39.7                         12.9   8.4   )-----------( 131      ( 01 Sep 2017 23:19 )             39.1     CAPILLARY BLOOD GLUCOSE  103 (03 Sep 2017 07:52)  192 (02 Sep 2017 12:14) Patient is a 82y old  Female who presents with a chief complaint of Chest Pain (01 Sep 2017 13:14)      SUBJECTIVE / OVERNIGHT EVENTS:  No overnight events. Patient denies any CP, SOB. eating well. States feels a little dizzy at times, admits she is not drinking much water.    MEDICATIONS  (STANDING):  aspirin enteric coated 81 milliGRAM(s) Oral daily  levothyroxine 25 MICROGram(s) Oral daily  atorvastatin 40 milliGRAM(s) Oral at bedtime  clopidogrel Tablet 75 milliGRAM(s) Oral daily  metoprolol 12.5 milliGRAM(s) Oral every 12 hours  heparin  Infusion.  Unit(s)/Hr (10 mL/Hr) IV Continuous <Continuous>  lisinopril 2.5 milliGRAM(s) Oral daily    MEDICATIONS  (PRN):  heparin  Injectable 4000 Unit(s) IV Push every 6 hours PRN For aPTT less than 40  heparin  Injectable 2000 Unit(s) IV Push every 6 hours PRN For aPTT between 40 - 57  guaiFENesin   Syrup  (Sugar-Free) 100 milliGRAM(s) Oral every 6 hours PRN Cough      Vital Signs Last 24 Hrs  T(C): 36.8 (03 Sep 2017 05:09), Max: 36.9 (02 Sep 2017 20:00)  T(F): 98.3 (03 Sep 2017 05:09), Max: 98.4 (02 Sep 2017 20:00)  HR: 91 (03 Sep 2017 05:09) (91 - 117)  BP: 99/64 (03 Sep 2017 05:09) (96/67 - 118/73)  BP(mean): --  RR: 16 (03 Sep 2017 05:09) (16 - 18)  SpO2: 93% (03 Sep 2017 05:09) (93% - 96%)    PHYSICAL EXAM:  GENERAL: NAD, well-developed  HEAD:  Atraumatic, Normocephalic  CHEST/LUNG: Mild rales posteriorly, otherwise clear to auscultation bilaterally  HEART: tachycardic, reg rhythm, no m/r/g.  ABDOMEN: Soft, Nontender, Nondistended; Bowel sounds present  EXTREMITIES:  No edema  PSYCH: Appropriately answering questions, at baseline  NEUROLOGY: non-focal  SKIN: No rashes or lesions    LABS:                         11.8   6.59  )-----------( 181      ( 03 Sep 2017 09:18 )             37.0     09-03    139  |  101  |  16  ----------------------------<  117<H>  4.2   |  21<L>  |  0.97    Ca    9.0      03 Sep 2017 09:18  Phos  3.1     09-03  Mg     2.0     09-03    TPro  7.1  /  Alb  3.3  /  TBili  1.8<H>  /  DBili  x   /  AST  111<H>  /  ALT  70<H>  /  AlkPhos  189<H>  09-03    PT/INR - ( 03 Sep 2017 09:18 )   PT: 12.5 sec;   INR: 1.10 ratio         PTT - ( 03 Sep 2017 16:49 )  PTT:59.1 sec    CAPILLARY BLOOD GLUCOSE  88 (03 Sep 2017 17:00)  155 (03 Sep 2017 11:57)  103 (03 Sep 2017 07:52)    Labs reviewed remarkable for elev lfts, stable.

## 2017-09-04 LAB
ALBUMIN SERPL ELPH-MCNC: 2.9 G/DL — LOW (ref 3.3–5)
ALP SERPL-CCNC: 171 U/L — HIGH (ref 40–120)
ALT FLD-CCNC: 46 U/L — HIGH (ref 10–45)
ANION GAP SERPL CALC-SCNC: 15 MMOL/L — SIGNIFICANT CHANGE UP (ref 5–17)
APTT BLD: 51.9 SEC — HIGH (ref 27.5–37.4)
APTT BLD: 71.1 SEC — HIGH (ref 27.5–37.4)
APTT BLD: 87.3 SEC — HIGH (ref 27.5–37.4)
AST SERPL-CCNC: 67 U/L — HIGH (ref 10–40)
BASOPHILS # BLD AUTO: 0.02 K/UL — SIGNIFICANT CHANGE UP (ref 0–0.2)
BASOPHILS NFR BLD AUTO: 0.5 % — SIGNIFICANT CHANGE UP (ref 0–2)
BILIRUB SERPL-MCNC: 1.3 MG/DL — HIGH (ref 0.2–1.2)
BUN SERPL-MCNC: 14 MG/DL — SIGNIFICANT CHANGE UP (ref 7–23)
CALCIUM SERPL-MCNC: 9 MG/DL — SIGNIFICANT CHANGE UP (ref 8.4–10.5)
CHLORIDE SERPL-SCNC: 100 MMOL/L — SIGNIFICANT CHANGE UP (ref 96–108)
CO2 SERPL-SCNC: 20 MMOL/L — LOW (ref 22–31)
CREAT SERPL-MCNC: 0.92 MG/DL — SIGNIFICANT CHANGE UP (ref 0.5–1.3)
EOSINOPHIL # BLD AUTO: 0.07 K/UL — SIGNIFICANT CHANGE UP (ref 0–0.5)
EOSINOPHIL NFR BLD AUTO: 1.6 % — SIGNIFICANT CHANGE UP (ref 0–6)
GLUCOSE SERPL-MCNC: 101 MG/DL — HIGH (ref 70–99)
HCT VFR BLD CALC: 33.3 % — LOW (ref 34.5–45)
HGB BLD-MCNC: 10.7 G/DL — LOW (ref 11.5–15.5)
IMM GRANULOCYTES NFR BLD AUTO: 0.2 % — SIGNIFICANT CHANGE UP (ref 0–1.5)
INR BLD: 1.16 RATIO — SIGNIFICANT CHANGE UP (ref 0.88–1.16)
LYMPHOCYTES # BLD AUTO: 1.34 K/UL — SIGNIFICANT CHANGE UP (ref 1–3.3)
LYMPHOCYTES # BLD AUTO: 30.9 % — SIGNIFICANT CHANGE UP (ref 13–44)
MAGNESIUM SERPL-MCNC: 1.9 MG/DL — SIGNIFICANT CHANGE UP (ref 1.6–2.6)
MCHC RBC-ENTMCNC: 29 PG — SIGNIFICANT CHANGE UP (ref 27–34)
MCHC RBC-ENTMCNC: 32.1 GM/DL — SIGNIFICANT CHANGE UP (ref 32–36)
MCV RBC AUTO: 90.2 FL — SIGNIFICANT CHANGE UP (ref 80–100)
MONOCYTES # BLD AUTO: 0.78 K/UL — SIGNIFICANT CHANGE UP (ref 0–0.9)
MONOCYTES NFR BLD AUTO: 18 % — HIGH (ref 2–14)
NEUTROPHILS # BLD AUTO: 2.12 K/UL — SIGNIFICANT CHANGE UP (ref 1.8–7.4)
NEUTROPHILS NFR BLD AUTO: 48.8 % — SIGNIFICANT CHANGE UP (ref 43–77)
PHOSPHATE SERPL-MCNC: 3.2 MG/DL — SIGNIFICANT CHANGE UP (ref 2.5–4.5)
PLATELET # BLD AUTO: 152 K/UL — SIGNIFICANT CHANGE UP (ref 150–400)
POTASSIUM SERPL-MCNC: 4.2 MMOL/L — SIGNIFICANT CHANGE UP (ref 3.5–5.3)
POTASSIUM SERPL-SCNC: 4.2 MMOL/L — SIGNIFICANT CHANGE UP (ref 3.5–5.3)
PROT SERPL-MCNC: 6.5 G/DL — SIGNIFICANT CHANGE UP (ref 6–8.3)
PROTHROM AB SERPL-ACNC: 13.1 SEC — SIGNIFICANT CHANGE UP (ref 10–13.1)
RBC # BLD: 3.69 M/UL — LOW (ref 3.8–5.2)
RBC # FLD: 13.4 % — SIGNIFICANT CHANGE UP (ref 10.3–14.5)
SODIUM SERPL-SCNC: 135 MMOL/L — SIGNIFICANT CHANGE UP (ref 135–145)
WBC # BLD: 4.34 K/UL — SIGNIFICANT CHANGE UP (ref 3.8–10.5)
WBC # FLD AUTO: 4.34 K/UL — SIGNIFICANT CHANGE UP (ref 3.8–10.5)

## 2017-09-04 PROCEDURE — 99233 SBSQ HOSP IP/OBS HIGH 50: CPT | Mod: GC

## 2017-09-04 RX ORDER — LEVOTHYROXINE SODIUM 125 MCG
25 TABLET ORAL DAILY
Qty: 0 | Refills: 0 | Status: DISCONTINUED | OUTPATIENT
Start: 2017-09-05 | End: 2017-09-05

## 2017-09-04 RX ORDER — IPRATROPIUM/ALBUTEROL SULFATE 18-103MCG
3 AEROSOL WITH ADAPTER (GRAM) INHALATION ONCE
Qty: 0 | Refills: 0 | Status: COMPLETED | OUTPATIENT
Start: 2017-09-04 | End: 2017-09-04

## 2017-09-04 RX ORDER — METOPROLOL TARTRATE 50 MG
25 TABLET ORAL DAILY
Qty: 0 | Refills: 0 | Status: DISCONTINUED | OUTPATIENT
Start: 2017-09-05 | End: 2017-09-05

## 2017-09-04 RX ORDER — METOPROLOL TARTRATE 50 MG
12.5 TABLET ORAL EVERY 12 HOURS
Qty: 0 | Refills: 0 | Status: COMPLETED | OUTPATIENT
Start: 2017-09-04 | End: 2017-09-04

## 2017-09-04 RX ORDER — SPIRONOLACTONE 25 MG/1
12.5 TABLET, FILM COATED ORAL DAILY
Qty: 0 | Refills: 0 | Status: DISCONTINUED | OUTPATIENT
Start: 2017-09-04 | End: 2017-09-05

## 2017-09-04 RX ADMIN — Medication 12.5 MILLIGRAM(S): at 17:32

## 2017-09-04 RX ADMIN — Medication 81 MILLIGRAM(S): at 11:52

## 2017-09-04 RX ADMIN — Medication 100 MILLIGRAM(S): at 06:13

## 2017-09-04 RX ADMIN — SPIRONOLACTONE 12.5 MILLIGRAM(S): 25 TABLET, FILM COATED ORAL at 11:52

## 2017-09-04 RX ADMIN — HEPARIN SODIUM 800 UNIT(S)/HR: 5000 INJECTION INTRAVENOUS; SUBCUTANEOUS at 23:40

## 2017-09-04 RX ADMIN — HEPARIN SODIUM 2000 UNIT(S): 5000 INJECTION INTRAVENOUS; SUBCUTANEOUS at 09:29

## 2017-09-04 RX ADMIN — HEPARIN SODIUM 800 UNIT(S)/HR: 5000 INJECTION INTRAVENOUS; SUBCUTANEOUS at 16:30

## 2017-09-04 RX ADMIN — Medication 100 MILLIGRAM(S): at 20:00

## 2017-09-04 RX ADMIN — Medication 3 MILLILITER(S): at 22:59

## 2017-09-04 RX ADMIN — HEPARIN SODIUM 800 UNIT(S)/HR: 5000 INJECTION INTRAVENOUS; SUBCUTANEOUS at 09:25

## 2017-09-04 RX ADMIN — CLOPIDOGREL BISULFATE 75 MILLIGRAM(S): 75 TABLET, FILM COATED ORAL at 11:52

## 2017-09-04 RX ADMIN — ATORVASTATIN CALCIUM 40 MILLIGRAM(S): 80 TABLET, FILM COATED ORAL at 21:35

## 2017-09-04 RX ADMIN — Medication 12.5 MILLIGRAM(S): at 06:01

## 2017-09-04 NOTE — PROGRESS NOTE ADULT - PROBLEM SELECTOR PLAN 3
?LV thrombus on TTE inpt.  - f/u MRI cardiac for closer visualization  - tx w hep gtt. monitor PTT q6h closely.  - obtain cardiac records of prior [facility] ?LV thrombus on TTE inpt.  - appreciate cards f/u  - f/u MRI cardiac for closer visualization  - tx w hep gtt. monitor PTT q6h closely.  - obtain cardiac records of prior [facility]

## 2017-09-04 NOTE — PROGRESS NOTE ADULT - PROBLEM SELECTOR PLAN 1
Likely 2/2 relative hypotension (from underlying MI and dehydration). Hemoglobin stable on heparin without evidence of bleeding.   - encourage PO intake  - Holding lasix for now for symptomatic hypotension   - Switching to metoprolol succinate tomorrow. Starting aldactone per cardiology recommendations with hold parameters. Likely 2/2 relative hypotension (from underlying MI and dehydration). Hemoglobin stable on heparin without evidence of bleeding.   - Has not had symptoms of dizziness before event that brought patient to hospital per family.   - encourage PO intake  - Holding lasix for now for symptomatic hypotension   - Switching to metoprolol succinate tomorrow. Starting aldactone per cardiology recommendations with hold parameters.

## 2017-09-04 NOTE — PROGRESS NOTE ADULT - SUBJECTIVE AND OBJECTIVE BOX
24H hour events:   no events overnight, reports feeling well, no chest pain no dyspnea  has been oob and walking to bathroom without events  pending cMRI    tele: sinus 70-90s    MEDICATIONS:  aspirin enteric coated 81 milliGRAM(s) Oral daily  clopidogrel Tablet 75 milliGRAM(s) Oral daily  metoprolol 12.5 milliGRAM(s) Oral every 12 hours  heparin  Infusion.  Unit(s)/Hr IV Continuous <Continuous>  heparin  Injectable 4000 Unit(s) IV Push every 6 hours PRN  heparin  Injectable 2000 Unit(s) IV Push every 6 hours PRN  lisinopril 2.5 milliGRAM(s) Oral daily      guaiFENesin   Syrup  (Sugar-Free) 100 milliGRAM(s) Oral every 6 hours PRN        levothyroxine 25 MICROGram(s) Oral daily  atorvastatin 40 milliGRAM(s) Oral at bedtime        REVIEW OF SYSTEMS:  General: no fatigue/malaise, weight loss/gain.  Skin: no rashes.  Ophthalmologic: no blurred vision, no loss of vision. 	  ENT: no sore throat, rhinorrhea, sinus congestion.  Respiratory: no SOB, cough or wheeze.  Gastrointestinal:  no N/V/D, no melena/hematemesis/hematochezia.  Genitourinary: no dysuria/hesitancy or hematuria.  Musculoskeletal: no myalgias or arthralgias.  Neurological: no changes in vision or hearing, no lightheadedness/dizziness, no syncope/near syncope	  Psychiatric: no unusual stress/anxiety.   Hematology/Lymphatics: no unusual bleeding, bruising and no lymphadenopathy.  Endocrine: no unusual thirst.   All others negative except as stated above and in HPI.    PHYSICAL EXAM:  T(C): 37 (09-04-17 @ 04:06), Max: 37 (09-04-17 @ 04:06)  HR: 100 (09-04-17 @ 04:06) (100 - 104)  BP: 99/66 (09-04-17 @ 04:06) (98/63 - 99/66)  RR: 18 (09-04-17 @ 04:06) (18 - 18)  SpO2: 96% (09-04-17 @ 04:06) (94% - 96%)  Wt(kg): --  I&O's Summary    03 Sep 2017 07:01  -  04 Sep 2017 07:00  --------------------------------------------------------  IN: 801 mL / OUT: 1000 mL / NET: -199 mL    04 Sep 2017 07:01  -  04 Sep 2017 08:53  --------------------------------------------------------  IN: 100 mL / OUT: 0 mL / NET: 100 mL        Appearance: Normal	  HEENT:   Normal oral mucosa, PERRL, EOMI	  Lymphatic: No lymphadenopathy  Cardiovascular: Normal S1 S2, LUSP 2/6 systolic murmur   Respiratory: Lungs clear to auscultation	  Psychiatry: A & O x 3, Mood & affect appropriate  Gastrointestinal:  Soft, Non-tender, + BS	  Skin: No rashes, No ecchymoses, No cyanosis	  Neurologic: Non-focal  Extremities: Normal range of motion, No clubbing, cyanosis or edema  Vascular: Peripheral pulses palpable 2+ bilaterally        LABS:	 	    CBC Full  -  ( 04 Sep 2017 08:10 )  WBC Count : 4.34 K/uL  Hemoglobin : 10.7 g/dL  Hematocrit : 33.3 %  Platelet Count - Automated : 152 K/uL  Mean Cell Volume : 90.2 fl  Mean Cell Hemoglobin : 29.0 pg  Mean Cell Hemoglobin Concentration : 32.1 gm/dL  Auto Neutrophil # : x  Auto Lymphocyte # : x  Auto Monocyte # : x  Auto Eosinophil # : x  Auto Basophil # : x  Auto Neutrophil % : x  Auto Lymphocyte % : x  Auto Monocyte % : x  Auto Eosinophil % : x  Auto Basophil % : x    09-03    139  |  101  |  16  ----------------------------<  117<H>  4.2   |  21<L>  |  0.97  09-02    138  |  101  |  18  ----------------------------<  121<H>  4.4   |  22  |  0.99    Ca    9.0      03 Sep 2017 09:18  Ca    9.2      02 Sep 2017 15:21  Phos  3.1     09-03  Phos  2.7     09-02  Mg     2.0     09-03  Mg     2.1     09-02    TPro  7.1  /  Alb  3.3  /  TBili  1.8<H>  /  DBili  x   /  AST  111<H>  /  ALT  70<H>  /  AlkPhos  189<H>  09-03  TPro  7.4  /  Alb  3.4  /  TBili  1.9<H>  /  DBili  x   /  AST  177<H>  /  ALT  70<H>  /  AlkPhos  176<H>  09-02      proBNP: Serum Pro-Brain Natriuretic Peptide: 706 pg/mL (08-31-17 @ 06:06)  PREVIOUS DIAGNOSTIC TESTING:    [ ] Echocardiogram:  < from: TTE with Doppler (w/Cont) (09.01.17 @ 11:32) >  Dimensions:    Normal Values:  LA:     3.6    2.0 - 4.0 cm  Ao:     3.1    2.0 - 3.8 cm  SEPTUM: 0.9    0.6 - 1.2 cm  PWT:    0.9    0.6 - 1.1 cm  LVIDd:  3.8    3.0 - 5.6 cm  LVIDs:  3.1    1.8- 4.0 cm  Derived variables:  LVMI: 66 g/m2  RWT: 0.47  Fractional short: 18 %  EF (Nguyễnicholtz): 39 %  ------------------------------------------------------------------------  Observations:  Mitral Valve: Mitral annular calcification. Mild mitral  regurgitation.  Aortic Valve/Aorta: Normal trileaflet aortic valve. Mild  aortic regurgitation.  Aortic Root: 3.1 cm.  LVOT diameter: 2 cm.  Left Atrium: Mild left atrial enlargement.  LA volume index  = 27 cc/m2.  Left Ventricle: Moderately reduced left ventricular  systolic function (segmental). The mid to distal  inferoseptum, midanterior wall, inferoapical, apicolateral,  apicoseptal walls are akinetic.  Enodcardial visualization  enhanced with intravenous contrast (definity). Incomplete  filling/swirling in the apicolateral wall suggestive, but  not definitive of left ventricular thrombus. Suggest CT/MRI  for further evaluation.  Basal septal hypertrophy (measures  1.5 cm), otherise normal left ventricular dimensions.  Right Heart: Normal right atrium. Normal right ventricular  size and function. Moderate tricuspid regurgitation. Normal  pulmonic valve.  Pericardium/Pleura: Normal pericardium with no pericardial  effusion.  Hemodynamic: Estimated right atrial pressure is 8 mm Hg.  Estimated right ventricular systolic pressure equals 42 mm  Hg, assuming right atrial pressure equals 8 mm Hg,  consistent with mild pulmonary hypertension.  ------------------------------------------------------------------------  Conclusions:  1. Basal septal hypertrophy (measures 1.5 cm), otherise  normal left ventricular dimension.  2. Moderately reduced left ventricular systolic function  (segmental). The mid to distal inferoseptum, mid-anterior,  inferoapical, apicolateral, and apicoseptal walls are  akinetic.  Enodcardial visualization enhanced with  intravenous contrast (definity). Incomplete  filling/swirling in the apicolateral wall suggestive, but  not definitive of left ventricular thrombus. Suggest CT/MRI  for further evaluation.  3. Moderate tricuspid regurgitation.  4. Estimated pulmonary artery systolic pressure equals 42  mm Hg, assuming right atrial pressure equals 8 mm Hg,  consistent with mild pulmonary pressures.  *** Compared with echocardiogram of 8/31/2017, no  significant changes noted.    < end of copied text >

## 2017-09-04 NOTE — PROGRESS NOTE ADULT - ASSESSMENT
81 yo mildy demented (AxO2), T2DM, hypothyroidism p/w fall and L sided chest pain p/w late presentation MI medically managing. Hosp course c/b ?LV thrombus on hep gtt. 83 yo mildy demented (AxO2), T2DM, hypothyroidism p/w fall and L sided chest pain p/w late presentation MI medically managing. Hosp course c/b ?LV thrombus on hep gtt pending cardiac MRI.

## 2017-09-04 NOTE — PROGRESS NOTE ADULT - PROBLEM SELECTOR PLAN 2
Likely related to above dizziness and dehydration  - encourage PO intake and monitor  - hold on diuresis

## 2017-09-04 NOTE — PROVIDER CONTACT NOTE (OTHER) - ASSESSMENT
Patient A/Ox4, complaining of weakness and presenting as tired. Patient VS stable, but systolic BP low 90s. Patient has no other complaints at this time. Upon auscultation of lungs, patient sounded with wheezing and diminished posteriorly.

## 2017-09-04 NOTE — PROGRESS NOTE ADULT - ASSESSMENT
82 year old woman w/ hx htn, dm2 (Diet Controlled), hypothyroidism, ?mild dementia p/w L sided chest pain concerning for late presentation MI   - cont asa/plavix dapt for 12mo then asa indefinitely   - TTE above indeterminate for LV thrombus, on hep gtt -> pending definitive test cardiac MRI, d/c anticoagulation if negative otherwise convert to coumadin  - assessed by interventional and deemed poor candidate for revascularization  **please obtain records prior cardiac catheterization  -HF-OMT - please convert to metoprolol succinate 25mg daily starting tomorrow and start aldactone 12.5mg qd today, cont lisinopril 2.5mg and,atorvastatin 40    [id number]

## 2017-09-04 NOTE — PROGRESS NOTE ADULT - PROBLEM SELECTOR PLAN 4
Concern for Late presentation MI vs NSTEMI as patient has q waves in V4 & V5 with positive troponin. Chest pain now resolved. Poor candidate for cath per interventional cardiology.  - s/p ASA, Brilinta load and heparin.  - Continue ASA and plavix  - started on small dose lisinopril, monitor bp  - Pending MRI to r/o LV thrombus Concern for Late presentation MI vs NSTEMI as patient has q waves in V4 & V5 with positive troponin. Chest pain now resolved. Poor candidate for cath per interventional cardiology.  - s/p ASA, Brilinta load and heparin.  - Continue ASA and plavix  - started on small dose lisinopril, monitor bp  - Pending MRI to r/o LV thrombus  - Has not had prior catheterization performed per family

## 2017-09-04 NOTE — PROGRESS NOTE ADULT - PROBLEM SELECTOR PLAN 5
Bedside TTE at CCU with moderately reduced left ventricular systolic function  and segmental wall motion abnormalities. The mid anterior  septum, the apical inferior wall, the apical anterior wall,  the apical septum, the apical lateral wall, the mid  anterior wall, and the mid inferoseptum are akinetic.    - Had rales on exam, however no other signs of fluid overload.  - Hold diuresis given hypotension and dizziness  - Monitor Ins & Outs.  - Continue BB as BP tolerates. added back lisinopril  - Switch to metoprolol succinate if BP tolerates

## 2017-09-04 NOTE — PROGRESS NOTE ADULT - ATTENDING COMMENTS
Patient seen and examined; agree with Cardiology Fellow assessment and plan.  --Patient feels well, denies any significant has been ambulating without difficulty.   --Late presentation MI--medically managed CAD.  --On Aspirin and Plavix.   --TTE with evidence of swirling contrast in LV apex/cannot rule out thrombus--await cardiac MR to evaluate for LV thrombus.  --Patient says she had a cardiac angiogram recently at another institution--would attempt to obtain records.  --Optimize ACE-I and long-acting beta blocker as BP can tolerate.

## 2017-09-05 VITALS — OXYGEN SATURATION: 73 % | RESPIRATION RATE: 7 BRPM

## 2017-09-05 DIAGNOSIS — Q21.0 VENTRICULAR SEPTAL DEFECT: ICD-10-CM

## 2017-09-05 LAB
ALBUMIN SERPL ELPH-MCNC: 2.8 G/DL — LOW (ref 3.3–5)
ALBUMIN SERPL ELPH-MCNC: 3.4 G/DL — SIGNIFICANT CHANGE UP (ref 3.3–5)
ALP SERPL-CCNC: 172 U/L — HIGH (ref 40–120)
ALP SERPL-CCNC: 205 U/L — HIGH (ref 40–120)
ALT FLD-CCNC: 76 U/L RC — HIGH (ref 10–45)
ALT FLD-CCNC: 99 U/L RC — HIGH (ref 10–45)
ANION GAP SERPL CALC-SCNC: 25 MMOL/L — HIGH (ref 5–17)
ANION GAP SERPL CALC-SCNC: 33 MMOL/L — HIGH (ref 5–17)
APTT BLD: 104.2 SEC — HIGH (ref 27.5–37.4)
APTT BLD: 57.5 SEC — HIGH (ref 27.5–37.4)
AST SERPL-CCNC: 109 U/L — HIGH (ref 10–40)
AST SERPL-CCNC: 148 U/L — HIGH (ref 10–40)
BASE EXCESS BLDMV CALC-SCNC: -27.9 MMOL/L — LOW (ref -3–3)
BASOPHILS # BLD AUTO: 0 K/UL — SIGNIFICANT CHANGE UP (ref 0–0.2)
BASOPHILS NFR BLD AUTO: 0.6 % — SIGNIFICANT CHANGE UP (ref 0–2)
BILIRUB SERPL-MCNC: 1.5 MG/DL — HIGH (ref 0.2–1.2)
BILIRUB SERPL-MCNC: 1.7 MG/DL — HIGH (ref 0.2–1.2)
BLD GP AB SCN SERPL QL: NEGATIVE — SIGNIFICANT CHANGE UP
BUN SERPL-MCNC: 28 MG/DL — HIGH (ref 7–23)
BUN SERPL-MCNC: 28 MG/DL — HIGH (ref 7–23)
CALCIUM SERPL-MCNC: 8 MG/DL — LOW (ref 8.4–10.5)
CALCIUM SERPL-MCNC: 9.4 MG/DL — SIGNIFICANT CHANGE UP (ref 8.4–10.5)
CHLORIDE SERPL-SCNC: 105 MMOL/L — SIGNIFICANT CHANGE UP (ref 96–108)
CHLORIDE SERPL-SCNC: 98 MMOL/L — SIGNIFICANT CHANGE UP (ref 96–108)
CK MB BLD-MCNC: 3.7 % — HIGH (ref 0–3.5)
CK MB BLD-MCNC: 5.1 % — HIGH (ref 0–3.5)
CK MB CFR SERPL CALC: 10.5 NG/ML — HIGH (ref 0–3.8)
CK MB CFR SERPL CALC: 13.2 NG/ML — HIGH (ref 0–3.8)
CK SERPL-CCNC: 258 U/L — HIGH (ref 25–170)
CK SERPL-CCNC: 286 U/L — HIGH (ref 25–170)
CO2 BLDMV-SCNC: 5 MMOL/L — LOW (ref 21–29)
CO2 SERPL-SCNC: 14 MMOL/L — LOW (ref 22–31)
CO2 SERPL-SCNC: 4 MMOL/L — CRITICAL LOW (ref 22–31)
CREAT SERPL-MCNC: 1.98 MG/DL — HIGH (ref 0.5–1.3)
CREAT SERPL-MCNC: 2.34 MG/DL — HIGH (ref 0.5–1.3)
EOSINOPHIL # BLD AUTO: 0 K/UL — SIGNIFICANT CHANGE UP (ref 0–0.5)
EOSINOPHIL NFR BLD AUTO: 0.2 % — SIGNIFICANT CHANGE UP (ref 0–6)
GAS PNL BLDA: SIGNIFICANT CHANGE UP
GAS PNL BLDMV: SIGNIFICANT CHANGE UP
GLUCOSE SERPL-MCNC: 136 MG/DL — HIGH (ref 70–99)
GLUCOSE SERPL-MCNC: 145 MG/DL — HIGH (ref 70–99)
HCO3 BLDMV-SCNC: 4 MMOL/L — LOW (ref 20–28)
HCT VFR BLD CALC: 35.9 % — SIGNIFICANT CHANGE UP (ref 34.5–45)
HCT VFR BLD CALC: 37.4 % — SIGNIFICANT CHANGE UP (ref 34.5–45)
HGB BLD-MCNC: 10.6 G/DL — LOW (ref 11.5–15.5)
HGB BLD-MCNC: 12 G/DL — SIGNIFICANT CHANGE UP (ref 11.5–15.5)
HOROWITZ INDEX BLDMV+IHG-RTO: 32 — SIGNIFICANT CHANGE UP
INR BLD: 1.3 RATIO — HIGH (ref 0.88–1.16)
LDH SERPL L TO P-CCNC: 978 U/L — HIGH (ref 50–242)
LYMPHOCYTES # BLD AUTO: 0.9 K/UL — LOW (ref 1–3.3)
LYMPHOCYTES # BLD AUTO: 16.1 % — SIGNIFICANT CHANGE UP (ref 13–44)
MAGNESIUM SERPL-MCNC: 2.2 MG/DL — SIGNIFICANT CHANGE UP (ref 1.6–2.6)
MAGNESIUM SERPL-MCNC: 2.6 MG/DL — SIGNIFICANT CHANGE UP (ref 1.6–2.6)
MCHC RBC-ENTMCNC: 29.4 GM/DL — LOW (ref 32–36)
MCHC RBC-ENTMCNC: 29.8 PG — SIGNIFICANT CHANGE UP (ref 27–34)
MCHC RBC-ENTMCNC: 30.6 PG — SIGNIFICANT CHANGE UP (ref 27–34)
MCHC RBC-ENTMCNC: 32.1 GM/DL — SIGNIFICANT CHANGE UP (ref 32–36)
MCV RBC AUTO: 101 FL — HIGH (ref 80–100)
MCV RBC AUTO: 95.4 FL — SIGNIFICANT CHANGE UP (ref 80–100)
MONOCYTES # BLD AUTO: 0.6 K/UL — SIGNIFICANT CHANGE UP (ref 0–0.9)
MONOCYTES NFR BLD AUTO: 10.9 % — SIGNIFICANT CHANGE UP (ref 2–14)
NEUTROPHILS # BLD AUTO: 4.3 K/UL — SIGNIFICANT CHANGE UP (ref 1.8–7.4)
NEUTROPHILS NFR BLD AUTO: 72.3 % — SIGNIFICANT CHANGE UP (ref 43–77)
O2 CT VFR BLD CALC: 90 MMHG — HIGH (ref 30–65)
PCO2 BLDMV: 24 MMHG — LOW (ref 30–65)
PH BLDMV: 6.89 — CRITICAL LOW (ref 7.32–7.45)
PHOSPHATE SERPL-MCNC: 10.9 MG/DL — HIGH (ref 2.5–4.5)
PHOSPHATE SERPL-MCNC: 6.9 MG/DL — HIGH (ref 2.5–4.5)
PLATELET # BLD AUTO: 140 K/UL — LOW (ref 150–400)
PLATELET # BLD AUTO: 169 K/UL — SIGNIFICANT CHANGE UP (ref 150–400)
POTASSIUM SERPL-MCNC: 5.5 MMOL/L — HIGH (ref 3.5–5.3)
POTASSIUM SERPL-MCNC: 5.7 MMOL/L — HIGH (ref 3.5–5.3)
POTASSIUM SERPL-SCNC: 5.5 MMOL/L — HIGH (ref 3.5–5.3)
POTASSIUM SERPL-SCNC: 5.7 MMOL/L — HIGH (ref 3.5–5.3)
PROT SERPL-MCNC: 5.8 G/DL — LOW (ref 6–8.3)
PROT SERPL-MCNC: 7.3 G/DL — SIGNIFICANT CHANGE UP (ref 6–8.3)
PROTHROM AB SERPL-ACNC: 14.3 SEC — HIGH (ref 9.8–12.7)
RBC # BLD: 3.55 M/UL — LOW (ref 3.8–5.2)
RBC # BLD: 3.93 M/UL — SIGNIFICANT CHANGE UP (ref 3.8–5.2)
RBC # FLD: 12 % — SIGNIFICANT CHANGE UP (ref 10.3–14.5)
RBC # FLD: 12 % — SIGNIFICANT CHANGE UP (ref 10.3–14.5)
RH IG SCN BLD-IMP: POSITIVE — SIGNIFICANT CHANGE UP
SAO2 % BLDMV: 85 % — SIGNIFICANT CHANGE UP (ref 60–90)
SODIUM SERPL-SCNC: 137 MMOL/L — SIGNIFICANT CHANGE UP (ref 135–145)
SODIUM SERPL-SCNC: 142 MMOL/L — SIGNIFICANT CHANGE UP (ref 135–145)
TROPONIN T SERPL-MCNC: 5.09 NG/ML — HIGH (ref 0–0.06)
TROPONIN T SERPL-MCNC: 5.3 NG/ML — HIGH (ref 0–0.06)
WBC # BLD: 5.9 K/UL — SIGNIFICANT CHANGE UP (ref 3.8–10.5)
WBC # BLD: 6.3 K/UL — SIGNIFICANT CHANGE UP (ref 3.8–10.5)
WBC # FLD AUTO: 5.9 K/UL — SIGNIFICANT CHANGE UP (ref 3.8–10.5)
WBC # FLD AUTO: 6.3 K/UL — SIGNIFICANT CHANGE UP (ref 3.8–10.5)

## 2017-09-05 PROCEDURE — 94640 AIRWAY INHALATION TREATMENT: CPT

## 2017-09-05 PROCEDURE — 97161 PT EVAL LOW COMPLEX 20 MIN: CPT

## 2017-09-05 PROCEDURE — 82550 ASSAY OF CK (CPK): CPT

## 2017-09-05 PROCEDURE — 86900 BLOOD TYPING SEROLOGIC ABO: CPT

## 2017-09-05 PROCEDURE — 83615 LACTATE (LD) (LDH) ENZYME: CPT

## 2017-09-05 PROCEDURE — 71045 X-RAY EXAM CHEST 1 VIEW: CPT

## 2017-09-05 PROCEDURE — 93306 TTE W/DOPPLER COMPLETE: CPT

## 2017-09-05 PROCEDURE — 85730 THROMBOPLASTIN TIME PARTIAL: CPT

## 2017-09-05 PROCEDURE — 93005 ELECTROCARDIOGRAM TRACING: CPT

## 2017-09-05 PROCEDURE — 93321 DOPPLER ECHO F-UP/LMTD STD: CPT | Mod: 26,59

## 2017-09-05 PROCEDURE — 82947 ASSAY GLUCOSE BLOOD QUANT: CPT

## 2017-09-05 PROCEDURE — 83735 ASSAY OF MAGNESIUM: CPT

## 2017-09-05 PROCEDURE — 84295 ASSAY OF SERUM SODIUM: CPT

## 2017-09-05 PROCEDURE — 85027 COMPLETE CBC AUTOMATED: CPT

## 2017-09-05 PROCEDURE — 82565 ASSAY OF CREATININE: CPT

## 2017-09-05 PROCEDURE — 84100 ASSAY OF PHOSPHORUS: CPT

## 2017-09-05 PROCEDURE — 82553 CREATINE MB FRACTION: CPT

## 2017-09-05 PROCEDURE — 83880 ASSAY OF NATRIURETIC PEPTIDE: CPT

## 2017-09-05 PROCEDURE — 71010: CPT | Mod: 26

## 2017-09-05 PROCEDURE — 83036 HEMOGLOBIN GLYCOSYLATED A1C: CPT

## 2017-09-05 PROCEDURE — C8929: CPT

## 2017-09-05 PROCEDURE — 86901 BLOOD TYPING SEROLOGIC RH(D): CPT

## 2017-09-05 PROCEDURE — 84484 ASSAY OF TROPONIN QUANT: CPT

## 2017-09-05 PROCEDURE — 85014 HEMATOCRIT: CPT

## 2017-09-05 PROCEDURE — 82330 ASSAY OF CALCIUM: CPT

## 2017-09-05 PROCEDURE — 82435 ASSAY OF BLOOD CHLORIDE: CPT

## 2017-09-05 PROCEDURE — 83605 ASSAY OF LACTIC ACID: CPT

## 2017-09-05 PROCEDURE — 70450 CT HEAD/BRAIN W/O DYE: CPT

## 2017-09-05 PROCEDURE — 80061 LIPID PANEL: CPT

## 2017-09-05 PROCEDURE — 93010 ELECTROCARDIOGRAM REPORT: CPT | Mod: 76

## 2017-09-05 PROCEDURE — 84443 ASSAY THYROID STIM HORMONE: CPT

## 2017-09-05 PROCEDURE — 71010: CPT | Mod: 26,77

## 2017-09-05 PROCEDURE — 85610 PROTHROMBIN TIME: CPT

## 2017-09-05 PROCEDURE — 97110 THERAPEUTIC EXERCISES: CPT

## 2017-09-05 PROCEDURE — 82803 BLOOD GASES ANY COMBINATION: CPT

## 2017-09-05 PROCEDURE — 93321 DOPPLER ECHO F-UP/LMTD STD: CPT

## 2017-09-05 PROCEDURE — 80053 COMPREHEN METABOLIC PANEL: CPT

## 2017-09-05 PROCEDURE — 93308 TTE F-UP OR LMTD: CPT | Mod: 26,59

## 2017-09-05 PROCEDURE — 97530 THERAPEUTIC ACTIVITIES: CPT

## 2017-09-05 PROCEDURE — 99285 EMERGENCY DEPT VISIT HI MDM: CPT | Mod: 25

## 2017-09-05 PROCEDURE — 86850 RBC ANTIBODY SCREEN: CPT

## 2017-09-05 PROCEDURE — 93306 TTE W/DOPPLER COMPLETE: CPT | Mod: 26

## 2017-09-05 PROCEDURE — 84132 ASSAY OF SERUM POTASSIUM: CPT

## 2017-09-05 PROCEDURE — 83690 ASSAY OF LIPASE: CPT

## 2017-09-05 PROCEDURE — 93308 TTE F-UP OR LMTD: CPT

## 2017-09-05 RX ORDER — NOREPINEPHRINE BITARTRATE/D5W 8 MG/250ML
0.02 PLASTIC BAG, INJECTION (ML) INTRAVENOUS
Qty: 16 | Refills: 0 | Status: DISCONTINUED | OUTPATIENT
Start: 2017-09-05 | End: 2017-09-05

## 2017-09-05 RX ORDER — DOBUTAMINE HCL 250MG/20ML
5 VIAL (ML) INTRAVENOUS
Qty: 500 | Refills: 0 | Status: DISCONTINUED | OUTPATIENT
Start: 2017-09-05 | End: 2017-09-05

## 2017-09-05 RX ORDER — PROCHLORPERAZINE MALEATE 5 MG
5 TABLET ORAL ONCE
Qty: 0 | Refills: 0 | Status: DISCONTINUED | OUTPATIENT
Start: 2017-09-05 | End: 2017-09-05

## 2017-09-05 RX ORDER — SODIUM CHLORIDE 9 MG/ML
1000 INJECTION INTRAMUSCULAR; INTRAVENOUS; SUBCUTANEOUS ONCE
Qty: 0 | Refills: 0 | Status: COMPLETED | OUTPATIENT
Start: 2017-09-05 | End: 2017-09-05

## 2017-09-05 RX ORDER — SODIUM BICARBONATE 1 MEQ/ML
50 SYRINGE (ML) INTRAVENOUS ONCE
Qty: 0 | Refills: 0 | Status: COMPLETED | OUTPATIENT
Start: 2017-09-05 | End: 2017-09-05

## 2017-09-05 RX ORDER — IBUPROFEN 200 MG
400 TABLET ORAL ONCE
Qty: 0 | Refills: 0 | Status: COMPLETED | OUTPATIENT
Start: 2017-09-05 | End: 2017-09-05

## 2017-09-05 RX ORDER — VASOPRESSIN 20 [USP'U]/ML
0.02 INJECTION INTRAVENOUS
Qty: 100 | Refills: 0 | Status: DISCONTINUED | OUTPATIENT
Start: 2017-09-05 | End: 2017-09-05

## 2017-09-05 RX ORDER — ONDANSETRON 8 MG/1
4 TABLET, FILM COATED ORAL ONCE
Qty: 0 | Refills: 0 | Status: COMPLETED | OUTPATIENT
Start: 2017-09-05 | End: 2017-09-05

## 2017-09-05 RX ADMIN — VASOPRESSIN 1.2 UNIT(S)/MIN: 20 INJECTION INTRAVENOUS at 09:00

## 2017-09-05 RX ADMIN — Medication 0.99 MICROGRAM(S)/KG/MIN: at 12:15

## 2017-09-05 RX ADMIN — Medication 25 MILLIGRAM(S): at 05:31

## 2017-09-05 RX ADMIN — ONDANSETRON 4 MILLIGRAM(S): 8 TABLET, FILM COATED ORAL at 08:00

## 2017-09-05 RX ADMIN — Medication 50 MILLIEQUIVALENT(S): at 11:34

## 2017-09-05 RX ADMIN — Medication 25 MICROGRAM(S): at 05:31

## 2017-09-05 RX ADMIN — Medication 400 MILLIGRAM(S): at 06:04

## 2017-09-05 RX ADMIN — SODIUM CHLORIDE 1000 MILLILITER(S): 9 INJECTION INTRAMUSCULAR; INTRAVENOUS; SUBCUTANEOUS at 09:00

## 2017-09-05 RX ADMIN — SPIRONOLACTONE 12.5 MILLIGRAM(S): 25 TABLET, FILM COATED ORAL at 05:31

## 2017-09-05 RX ADMIN — LISINOPRIL 2.5 MILLIGRAM(S): 2.5 TABLET ORAL at 05:30

## 2017-09-05 RX ADMIN — Medication 400 MILLIGRAM(S): at 05:30

## 2017-09-05 NOTE — CHART NOTE - NSCHARTNOTEFT_GEN_A_CORE
Called to evaluate pt for wheezing on exam. Found pt to be hemodynamically stable, tachypnic to 111, sinus tach on tele to 110s. Pt denied dizziness, SOB, difficulty breathing, CP or palpitations. On exam, lungs with mild expiratory wheezes, RRR, no m/g/r. Pt states has inhaler at home, unclear what medication. Given 1 dose duonebs.    Called again by pt's nurse for complaint of pain below left breast. Pt seen and examined at bedside, sleeping when I arrived. On wakening the pt, she stated the pain had resolved and that it was different from the CP that she had on presentation. She said it felt like a pulling in her breast and not inside the chest itself. Denied SOB but endorsed tiredness and limited sleep while in the hospital. Tele showed sinus tach to 110s, no changes in past few hours. VS positive for tachycardia to 114 and mild tachypnea to 24 but satting to 94% on RA. Pt in NAD, lungs CTAB with mild transmitted noises from upper airway, breast non-tender to palpation, RRR, no m/g/r. Pain unlikely cardiac as self-resolved quickly, no changes to tele, no associated symptoms. Will follow and reassess if any changes to clinical status.

## 2017-09-05 NOTE — DISCHARGE NOTE FOR THE EXPIRED PATIENT - HOSPITAL COURSE
81 yo mildy demented (AxO2), T2DM, hypothyroidism p/w fall and L sided chest pain p/w late presentation MI medically managing. Hosp course c/b  questionable LV thrombus on hep gtt pending cardiac MRI. RRT called on  d/t hypotension w/ SBP in 50s concerning for cardiogenic shock. Pt transferred in CCU, heparin gtt d/c, started on vasopressin and levophed. TTE showed new VSD. Femoral A-line and [place] placed for pt. Pt continued to be hypotensive despite pressors. Family was called and informed of the patient's status and decided to make patient DNR/DNI.     I was called to the bedside to pronounce the patient has . No spontaneous movements were present. There was no response to verbal or tactile stimuli. Pupils were mid-dilated and fixed. No breath sounds were appreciated over either lung field. No carotid pulses were palpable. No heart sounds were auscultated over the entire precordium. Patient was pronounced dead at 13:47 on 2017. Family was at bedside and declined autopsy.  services were offered. Patient's cause of death was cardiopulmonary arrest secondary to cardiogenic shock secondary to MI.

## 2017-09-05 NOTE — PROVIDER CONTACT NOTE (OTHER) - ACTION/TREATMENT ORDERED:
Turn heparin off, CT scan ordered
LFTs high, will give motrin
Pharmacist is contacted. Supervisor is aware of situation. Manager is aware of situation. Pharmacist said to jackson as done and bar code is unreadable.
Will come to assess, continue to monitor
Will order ty

## 2017-09-05 NOTE — PROVIDER CONTACT NOTE (OTHER) - REASON
Auscultated with wheezing
Heparin bag drip is unable to scan
Patient complaining of 5/10 back pain
Patient is complaining of pain below her left breast
Pt had an unwitnessed fall at home, no CT scan was done, pt in on heparin drip

## 2017-09-05 NOTE — PROVIDER CONTACT NOTE (CRITICAL VALUE NOTIFICATION) - ACTION/TREATMENT ORDERED:
MD aware, continue with current treatment
administer bicarb
continue current management
follow heparin Nomogram stop drip for 60min and restart at new rate 8cc/hr. will continue to monitor

## 2017-09-05 NOTE — CHART NOTE - NSCHARTNOTEFT_GEN_A_CORE
RRT called for hypotension (SBP in 50s).  Pt admitted w/ STEMI but did not undergo revascularization, so concern for cardiogenic shock.  Pt lethargic but arousable & can answer questions. Pt c/o of dizziness & back pain. Pt given 500cc NS bolus & started on dobutamine gtt. SBP when checked once on manual machine ~80 but persistently 50-60 on automatic machine despite interventions. Dobutamine gtt eventually changed to levophed to optimize diastolic filling.  Heparin gtt stopped 2/2 hypotension in case of hypovolemia from bleeding though considered less likely. EKG shows persistent ST elevations but less pronounced from prior EKGs.  Cards fellows called & present during RRT.  Primary team resident (Dr. Church) & cards fellow, Dr. Motley remained present for the remainder of the RRT until BP stabilized for transport to CCU.    [place]  [id number] RRT called for hypotension (SBP in 50s).  Pt admitted w/ late presentation STEMI but did not undergo revascularization (was being medically managed), so concern for cardiogenic shock.  Pt lethargic but arousable & can answer questions. Pt c/o of dizziness & back pain. Pt given 500cc NS bolus & started on dobutamine gtt. SBP on manual machine ~80 but persistently 50-60 on automatic machine despite interventions. Heparin gtt, which pt was on given concern for LV thrombus, was held in case hypotension was 2/2 hypovolemia from bleeding. EKG shows persistent ST elevations in precordial leads but less pronounced from prior EKGs on my review.  TTE tech came for urgent TTE which did not show any structural abnormalities such as wall rupture. Plan was to change dobutamine gtt to levophed to optimize diastolic filling, per cards fellow. Primary team resident (Dr. Church) & cards fellow, Dr. Motley remained present for the remainder of the RRT until BP stabilized for transport to CCU.    [place]  [id number] RRT called for hypotension (SBP in 50s).  Pt admitted w/ late presentation STEMI but did not undergo revascularization (was being medically managed), so concern for cardiogenic shock.  Pt lethargic but arousable & can answer questions. Pt c/o of dizziness & back pain. Pt given 500cc NS bolus & started on dobutamine gtt. SBP on manual machine ~80 but persistently 50-60 on automatic machine despite interventions. Heparin gtt, which pt was on given concern for LV thrombus, was held in case hypotension was 2/2 hypovolemia from bleeding. EKG shows persistent ST elevations in precordial leads but less pronounced from prior EKGs on my review.  TTE tech came for urgent TTE which did not show any structural abnormalities such as wall rupture. Plan was to change dobutamine gtt to levophed to optimize diastolic filling, per cards fellow. Primary team resident (Dr. Church) & cards fellow, (Dr. Motley) remained present for the remainder of the RRT until BP stabilized for transport to CCU.    [place]  [id number] RRT called for hypotension (SBP in 50s).  Pt admitted w/ late presentation STEMI but did not undergo revascularization (was being medically managed), so concern for cardiogenic shock.  Pt lethargic but arousable & can answer questions. Pt c/o of dizziness & back pain. Pt given 500cc NS bolus & started on dobutamine gtt. SBP on manual machine ~80 but persistently 50-60 on automatic machine despite interventions. Heparin gtt, which pt was on given concern for LV thrombus, was held in case hypotension was 2/2 hypovolemia from bleeding. EKG shows persistent ST elevations in precordial leads but less pronounced from prior EKGs on my review.  TTE tech came for urgent TTE which did not show any structural abnormalities such as wall rupture. Plan was to change dobutamine gtt to levophed to optimize diastolic filling, per cards fellow. Primary team resident (Dr. Church) & cards fellow, (Dr. Motley) remained present for the remainder of the RRT until BP stabilized for transport to CCU.    Addendum:  TTE formal report showed muscular ventricular septal defect seen in the apical septal wall with left to right shunting.    [place]  [id number]

## 2017-09-05 NOTE — PROGRESS NOTE ADULT - ASSESSMENT
81 yo mildy demented (AxO2), T2DM, hypothyroidism p/w fall and L sided chest pain p/w late presentation MI medically managing. Hosp course c/b  questionable LV thrombus on hep gtt pending cardiac MRI. RRT called on 9/5 d/t hypotension w/ SBP in 50s concerning for cardiogenic shock. Pt transferred in CCU, heparin gtt d/c, started on vasopressin and levophed. TTE showed new VSD.

## 2017-09-05 NOTE — PROVIDER CONTACT NOTE (OTHER) - ASSESSMENT
Patient is A/Ox4, complaining of non specific pain below her left breast VS stable  97.5 PO , 117/64 RR24 94% on RA. Patient is breathing heavy and has already completed duoneb.

## 2017-09-05 NOTE — PROGRESS NOTE ADULT - SUBJECTIVE AND OBJECTIVE BOX
CCU Accept note     Patient is a 82y old  Female who presents with a chief complaint of Chest Pain (01 Sep 2017 13:14)      SUBJECTIVE / OVERNIGHT EVENTS:    HPI:   	  82 F htn, dm2 (Diet Controlled), hypothyroidism, mild dementia, p/w L sided chest pain since 4pm yest, starting at rest, described as pressure, non radiating, non pleuritic  Denies SOB/Nausea/vomiting. No recent illnesses, Per son, she also had a fall today however patient denies falling. No pleuritic pain. No radiation to back/arm. Denies headache, dizziness, abdominal pain. Of note patient is a poor historian.   In the CCU patient had episodes of NSVT (polymorphic) for a brief period which resolved.       VS: T: 98, HR: 100-120's, BP: /60-70's, RR: 22 97 % on 2L NC  EKG: ST elevation in V3-V5 with q waves in V4 & V5. Left axis deviation, Left anterior fascicular block  Labs personally reviewed: CBC wnl, CMP significant for potassium of 5.2 ( Hemolyzed). Paraprotein gap of 4.2, alk phos of 189, AST 67. CK of 432, CKMB 35.9, Trop T .45. VBG pH of 7.35, pCO2 of 55, lactate of 4.3  In the ED patient was aspirin and Brilinta loaded. Given 50mg lopressor PO.  Heparin bolus   TTE from Oct 2016:  EF 60%  Mild diastolic dysfunction (Stage I). Estimated pulmonary artery systolic pressure equals 38 mm Hg, assuming right atrial pressure equals 8 mm Hg,  consistent with borderline pulmonary pressures.    MEDICATIONS  (STANDING):  aspirin enteric coated 81 milliGRAM(s) Oral daily  atorvastatin 40 milliGRAM(s) Oral at bedtime  clopidogrel Tablet 75 milliGRAM(s) Oral daily  heparin  Infusion.  Unit(s)/Hr (10 mL/Hr) IV Continuous <Continuous>  lisinopril 2.5 milliGRAM(s) Oral daily  metoprolol succinate ER 25 milliGRAM(s) Oral daily  spironolactone 12.5 milliGRAM(s) Oral daily  levothyroxine 25 MICROGram(s) Oral daily  prochlorperazine   IVPB 5 milliGRAM(s) IV Intermittent once  DOBUTamine Infusion 5 MICROgram(s)/kG/Min (7.95 mL/Hr) IV Continuous <Continuous>  ondansetron Injectable 4 milliGRAM(s) IV Push once  sodium chloride 0.9% Bolus 1000 milliLiter(s) IV Bolus once    MEDICATIONS  (PRN):  heparin  Injectable 4000 Unit(s) IV Push every 6 hours PRN For aPTT less than 40  heparin  Injectable 2000 Unit(s) IV Push every 6 hours PRN For aPTT between 40 - 57  guaiFENesin   Syrup  (Sugar-Free) 100 milliGRAM(s) Oral every 6 hours PRN Cough        CAPILLARY BLOOD GLUCOSE  121 (04 Sep 2017 11:50)        I&O's Summary    04 Sep 2017 07:01  -  05 Sep 2017 07:00  --------------------------------------------------------  IN: 744 mL / OUT: 450 mL / NET: 294 mL        PHYSICAL EXAM:  GENERAL: NAD, well-developed  HEAD:  Atraumatic, Normocephalic  EYES: EOMI, PERRLA, conjunctiva and sclera clear  NECK: Supple, No JVD  CHEST/LUNG: Clear to auscultation bilaterally; No wheeze  HEART: Regular rate and rhythm; No murmurs, rubs, or gallops  ABDOMEN: Soft, Nontender, Nondistended; Bowel sounds present  EXTREMITIES:  2+ Peripheral Pulses, No clubbing, cyanosis, or edema  PSYCH: AAOx3  NEUROLOGY: non-focal  SKIN: No rashes or lesions    LABS:                        12.0   5.9   )-----------( 169      ( 05 Sep 2017 06:46 )             37.4     09-05    137  |  98  |  28<H>  ----------------------------<  145<H>  5.7<H>   |  14<L>  |  1.98<H>    Ca    9.4      05 Sep 2017 06:46  Phos  6.9     09-05  Mg     2.2     09-05    TPro  7.3  /  Alb  3.4  /  TBili  1.7<H>  /  DBili  x   /  AST  109<H>  /  ALT  76<H>  /  AlkPhos  205<H>  09-05    PT/INR - ( 05 Sep 2017 06:46 )   PT: 14.3 sec;   INR: 1.30 ratio         PTT - ( 05 Sep 2017 06:46 )  PTT:104.2 sec  CARDIAC MARKERS ( 05 Sep 2017 06:46 )  x     / 5.30 ng/mL / 286 U/L / x     / 10.5 ng/mL          RADIOLOGY & ADDITIONAL TESTS:    Imaging Personally Reviewed:    Consultant(s) Notes Reviewed:      Care Discussed with Consultants/Other Providers: CCU Accept note     Patient is a 82y old  Female who presents with a chief complaint of Chest Pain (01 Sep 2017 13:14)      SUBJECTIVE / OVERNIGHT EVENTS: RRT called last night for hypotension SBPs in the 50s w/ concern for cardiogenic shock. Pt lethargic but arousable. Started on dobutamine gtt but     HPI:   	  82 F htn, dm2 (Diet Controlled), hypothyroidism, mild dementia, p/w L sided chest pain since 4pm yest, starting at rest, described as pressure, non radiating, non pleuritic  Denies SOB/Nausea/vomiting. No recent illnesses, Per son, she also had a fall today however patient denies falling. No pleuritic pain. No radiation to back/arm. Denies headache, dizziness, abdominal pain. Of note patient is a poor historian.   In the CCU patient had episodes of NSVT (polymorphic) for a brief period which resolved.       VS: T: 98, HR: 100-120's, BP: /60-70's, RR: 22 97 % on 2L NC  EKG: ST elevation in V3-V5 with q waves in V4 & V5. Left axis deviation, Left anterior fascicular block  Labs personally reviewed: CBC wnl, CMP significant for potassium of 5.2 ( Hemolyzed). Paraprotein gap of 4.2, alk phos of 189, AST 67. CK of 432, CKMB 35.9, Trop T .45. VBG pH of 7.35, pCO2 of 55, lactate of 4.3  In the ED patient was aspirin and Brilinta loaded. Given 50mg lopressor PO.  Heparin bolus   TTE from Oct 2016:  EF 60%  Mild diastolic dysfunction (Stage I). Estimated pulmonary artery systolic pressure equals 38 mm Hg, assuming right atrial pressure equals 8 mm Hg,  consistent with borderline pulmonary pressures.    MEDICATIONS  (STANDING):  aspirin enteric coated 81 milliGRAM(s) Oral daily  atorvastatin 40 milliGRAM(s) Oral at bedtime  clopidogrel Tablet 75 milliGRAM(s) Oral daily  heparin  Infusion.  Unit(s)/Hr (10 mL/Hr) IV Continuous <Continuous>  lisinopril 2.5 milliGRAM(s) Oral daily  metoprolol succinate ER 25 milliGRAM(s) Oral daily  spironolactone 12.5 milliGRAM(s) Oral daily  levothyroxine 25 MICROGram(s) Oral daily  prochlorperazine   IVPB 5 milliGRAM(s) IV Intermittent once  DOBUTamine Infusion 5 MICROgram(s)/kG/Min (7.95 mL/Hr) IV Continuous <Continuous>  ondansetron Injectable 4 milliGRAM(s) IV Push once  sodium chloride 0.9% Bolus 1000 milliLiter(s) IV Bolus once    MEDICATIONS  (PRN):  heparin  Injectable 4000 Unit(s) IV Push every 6 hours PRN For aPTT less than 40  heparin  Injectable 2000 Unit(s) IV Push every 6 hours PRN For aPTT between 40 - 57  guaiFENesin   Syrup  (Sugar-Free) 100 milliGRAM(s) Oral every 6 hours PRN Cough        CAPILLARY BLOOD GLUCOSE  121 (04 Sep 2017 11:50)        I&O's Summary    04 Sep 2017 07:01  -  05 Sep 2017 07:00  --------------------------------------------------------  IN: 744 mL / OUT: 450 mL / NET: 294 mL        PHYSICAL EXAM:  GENERAL: NAD, well-developed  HEAD:  Atraumatic, Normocephalic  EYES: EOMI, PERRLA, conjunctiva and sclera clear  NECK: Supple, No JVD  CHEST/LUNG: Clear to auscultation bilaterally; No wheeze  HEART: Regular rate and rhythm; No murmurs, rubs, or gallops  ABDOMEN: Soft, Nontender, Nondistended; Bowel sounds present  EXTREMITIES:  2+ Peripheral Pulses, No clubbing, cyanosis, or edema  PSYCH: AAOx3  NEUROLOGY: non-focal  SKIN: No rashes or lesions    LABS:                        12.0   5.9   )-----------( 169      ( 05 Sep 2017 06:46 )             37.4     09-05    137  |  98  |  28<H>  ----------------------------<  145<H>  5.7<H>   |  14<L>  |  1.98<H>    Ca    9.4      05 Sep 2017 06:46  Phos  6.9     09-05  Mg     2.2     09-05    TPro  7.3  /  Alb  3.4  /  TBili  1.7<H>  /  DBili  x   /  AST  109<H>  /  ALT  76<H>  /  AlkPhos  205<H>  09-05    PT/INR - ( 05 Sep 2017 06:46 )   PT: 14.3 sec;   INR: 1.30 ratio         PTT - ( 05 Sep 2017 06:46 )  PTT:104.2 sec  CARDIAC MARKERS ( 05 Sep 2017 06:46 )  x     / 5.30 ng/mL / 286 U/L / x     / 10.5 ng/mL          RADIOLOGY & ADDITIONAL TESTS:    Imaging Personally Reviewed:    Consultant(s) Notes Reviewed:      Care Discussed with Consultants/Other Providers:

## 2017-09-05 NOTE — PROGRESS NOTE ADULT - ATTENDING COMMENTS
Patient with unrevascularized MI approximately four days ago acutely decompensated overnight with change in mental status, respiratory distress, hypotension, and acute kidney injury.  Deneen showed markedly elevated RV pressures with low pulmonary capillary wedge pressure and bedside echocardiogram (by my read) confirmed an apical ventricular septal rupture.  CT surgery was consulted, but given reported history of dementia and active multiorgan failure, patient was determined to be poor candidate for emergent surgery.    Patient subsequently made DNR and passed away with family at bedside.

## 2017-09-05 NOTE — PROVIDER CONTACT NOTE (CRITICAL VALUE NOTIFICATION) - ASSESSMENT
awake, low SBP 70's, on vasopressin & levophed
cold, clammy, tachycardic, hypotensive
Pt not in distress. VSS. No active signs of bleeding.
low SBP, on vasopressin & levophed gtt

## 2017-09-05 NOTE — CONSULT NOTE ADULT - SUBJECTIVE AND OBJECTIVE BOX
History of Present Illness:  HPI:   This is an 82 F presented to ED on 8/31 PMH  htn, dm2 (Diet Controlled), hypothyroidism, mild dementia. Presented with L sided chest pain , q waves and LYNDA precordial leads,  signs of HF, loaded w/ ASA/brilinta, hep started in ED, BB given, also given 500 cc for hypotension and admitted to CCU. 8/31. Not candidate for Cath/PCI due to falls and dementia history will medical management     In the CCU patient had episodes of NSVT (polymorphic) for a brief period which resolved.  transferred to floor. Pt experienced decompensation  RRT on 9/5 transferred to CCU TTE - Compared with echocardiogram of 9/1/2017,   findings of possible VSD are new.           VS: T: 98, HR: 100-120's, BP: /60-70's, RR: 22 97 % on 2L NC  EKG: ST elevation in V3-V5 with q waves in V4 & V5. Left axis deviation, Left anterior fascicular block  Labs personally reviewed: CBC wnl, CMP significant for potassium of 5.2 ( Hemolyzed). Paraprotein gap of 4.2, alk phos of 189, AST 67. CK of 432, CKMB 35.9, Trop T .45. VBG pH of 7.35, pCO2 of 55, lactate of 4.3  In the ED patient was aspirin and Brilinta loaded. Given 50mg lopressor PO.  Heparin bolus   TTE from Oct 2016:  EF 60%  Mild diastolic dysfunction (Stage I). Estimated pulmonary artery systolic pressure equals 38 mm Hg, assuming right atrial pressure equals 8 mm Hg,  consistent with borderline pulmonary pressures. (31 Aug 2017 05:20)    CT Surgery consulted for VSD seen on TTE 9/5 Compared with echocardiogram of 9/1/2017,   findings of possible VSD are new.     Past Medical History  Diabetes  Hypothyroidism  HTN (hypertension)      Past Surgical History  S/P tonsillectomy  H/O abdominal hysterectomy  No significant past surgical history      MEDICATIONS  (STANDING):  aspirin enteric coated 81 milliGRAM(s) Oral daily  levothyroxine 25 MICROGram(s) Oral daily  prochlorperazine   IVPB 5 milliGRAM(s) IV Intermittent once  ondansetron Injectable 4 milliGRAM(s) IV Push once  sodium chloride 0.9% Bolus 1000 milliLiter(s) IV Bolus once  vasopressin Infusion 0.02 Unit(s)/Min (1.2 mL/Hr) IV Continuous <Continuous>    MEDICATIONS  (PRN):  guaiFENesin   Syrup  (Sugar-Free) 100 milliGRAM(s) Oral every 6 hours PRN Cough    Vital Signs Last 24 Hrs  T(C): 36.6 (09-05-17 @ 05:14), Max: 36.7 (09-04-17 @ 14:43)  T(F): 97.9 (09-05-17 @ 05:14), Max: 98.1 (09-04-17 @ 14:43)  HR: 107 (09-05-17 @ 05:14) (95 - 114)  BP: 128/53 (09-05-17 @ 05:14) (90/62 - 128/53)  RR: 20 (09-05-17 @ 05:14) (18 - 24)  SpO2: 95% (09-05-17 @ 05:14) (94% - 99%)                  Admit Wt: Drug Dosing Weight  Height (cm): 157.48 (31 Aug 2017 05:23)  Weight (kg): 53 (31 Aug 2017 05:23)  BMI (kg/m2): 21.4 (31 Aug 2017 05:23)  BSA (m2): 1.52 (31 Aug 2017 05:23)    Allergies: streptomycin (Unknown)      SOCIAL HISTORY:  Smoker: [ ] Yes  [ ] No        PACK YEARS:                         WHEN QUIT?  ETOH use: [ ] Yes  [ ] No              FREQUENCY / QUANTITY:  Ilicit Drug use:  [ ] Yes  [ ] No      Relevant Family History  FAMILY HISTORY:  Family history of diabetes mellitus (Grandparent): grand mother      Review of Systems  GENERAL:  no weakness, fatigue, fevers or chills  NEURO: DESIREE hx dementia  SKIN: no itching, burning, rashes, or lesions   HEENT: no visual changes;  no headache, no vertigo, no recent colds  RESPIRATORY: no shortness of breath, no cough, sputum, wheezing, hemoptysis;   CARDIOVASCULAR:  no chest pain,  or palpitations  GI: no abdominal or epigastric pain. no heartburn, nausea, vomiting, or hematemesis; no diarrhea or constipation. no melena  PERIPHERAL VASCULAR: no swelling, no tenderness, no erythema, no intermittent claudication, leg cramps, no varicose veins     PHYSICAL EXAM  General: Well nourished, well developed, NAD.                                              Neuro: DESIREE.                    Eyes: Normal exam of conjunctiva & lids, pupils equally reactive.   ENT: Normal exam of nasal/oral mucosa with absence of cyanosis.   Neck: Normal exam of jugular veins, trachea & thyroid.   Chest: Normal lung exam with good air movement absence of wheezes, rales, or rhonchi:                                                                          CV:  Auscultation:    Carotids: No Bruits[ ]  Abdominal Aorta: normal [ ] nonpalpable[ ]                                                                         GI: Normal exam of abdomen with no noted masses or tenderness. +BSx4Q                                                                                            Extremities: Normal no evidence of cyanosis or deformity, Edema:   Lower Extremity Pulses: Right[ ] Left[ ]Varicosities[ ]  SKIN : Normal exam to inspection & palpation.                                                           LABS:                        12.0   5.9   )-----------( 169      ( 05 Sep 2017 06:46 )             37.4     09-05    137  |  98  |  28<H>  ----------------------------<  145<H>  5.7<H>   |  14<L>  |  1.98<H>    Ca    9.4      05 Sep 2017 06:46  Phos  6.9     09-05  Mg     2.2     09-05    TPro  7.3  /  Alb  3.4  /  TBili  1.7<H>  /  DBili  x   /  AST  109<H>  /  ALT  76<H>  /  AlkPhos  205<H>  09-05    PT/INR - ( 05 Sep 2017 06:46 )   PT: 14.3 sec;   INR: 1.30 ratio         PTT - ( 05 Sep 2017 06:46 )  PTT:104.2 sec      Cardiac Cath:    TTE / MEJIA:< from: TTE with Doppler (w/Cont) (09.05.17 @ 07:13) >  Severe segmental left ventricular systolic dysfunction.  The mid to apical inferoseptal, lateral, anteroseptal,  anterior and inferior walls are akinetic and somewhat  aneurysmal. There is a defect seen in the apical  inferoseptum that may represent a muscular VSD. Suggest  repeat echo with Doppler of the mid to distal septum.  2. Right ventricular enlargement with decreased right  ventricular systolic function.  3. Trace pericardial effusion.  *** Compared with echocardiogram of 9/1/2017, above  findings of possible VSD are new. Findings discussed with  Dr. Cordero at 930 on 9/5/17.  ------------------------------------------------------------------------    < end of copied text >  < from: TTE with Doppler (w/Cont) (09.05.17 @ 07:13) >  Severe segmental left ventricular systolic dysfunction.  The mid to apical inferoseptal, lateral, anteroseptal,  anterior and inferior walls are akinetic and somewhat  aneurysmal. There is a defect seen in the apical  inferoseptum that may represent a muscular VSD. Suggest  repeat echo with Doppler of the mid to distal septum.  2. Right ventricular enlargement with decreased right  ventricular systolic function.  3. Trace pericardial effusion.  *** Compared with echocardiogram of 9/1/2017, above  findings of possible VSD are new. Findings discussed with  Dr. Cordero at 930 on 9/5/17.  ------------------------------------------------------------------------    < end of copied text >      Assessment:  82y Female presents with STEMI (ST elevation myocardial infarction)      Plan: History of Present Illness:  HPI:   This is an 82 F presented to ED on 8/31 PMH  htn, dm2 (Diet Controlled), hypothyroidism, mild dementia. Presented with L sided chest pain , q waves and LYNDA precordial leads,  signs of HF, loaded w/ ASA/brilinta, hep started in ED, BB given, also given 500 cc for hypotension and admitted to CCU. 8/31. Not candidate for Cath/PCI due to falls and dementia history will medical management     In the CCU patient had episodes of NSVT (polymorphic) for a brief period which resolved.  transferred to floor. Pt experienced decompensation  RRT on 9/5 transferred to CCU TTE - Compared with echocardiogram of 9/1/2017,   findings of possible VSD are new. Patient on double pressors swan ruma catheter  in place Cardiac surgery consulted called        CT Surgery consulted for VSD seen on TTE 9/5 Compared with echocardiogram of 9/1/2017,   findings of possible VSD are new.     Past Medical History  Diabetes  Hypothyroidism  HTN (hypertension)      Past Surgical History  S/P tonsillectomy  H/O abdominal hysterectomy  No significant past surgical history      MEDICATIONS  (STANDING):  aspirin enteric coated 81 milliGRAM(s) Oral daily  levothyroxine 25 MICROGram(s) Oral daily  prochlorperazine   IVPB 5 milliGRAM(s) IV Intermittent once  ondansetron Injectable 4 milliGRAM(s) IV Push once  sodium chloride 0.9% Bolus 1000 milliLiter(s) IV Bolus once  vasopressin Infusion 0.02 Unit(s)/Min (1.2 mL/Hr) IV Continuous <Continuous>    MEDICATIONS  (PRN):  guaiFENesin   Syrup  (Sugar-Free) 100 milliGRAM(s) Oral every 6 hours PRN Cough    Vital Signs Last 24 Hrs  T(C): 36.6 (09-05-17 @ 05:14), Max: 36.7 (09-04-17 @ 14:43)  T(F): 97.9 (09-05-17 @ 05:14), Max: 98.1 (09-04-17 @ 14:43)  HR: 107 (09-05-17 @ 05:14) (95 - 114)  BP: 128/53 (09-05-17 @ 05:14) (90/62 - 128/53)  RR: 20 (09-05-17 @ 05:14) (18 - 24)  SpO2: 95% (09-05-17 @ 05:14) (94% - 99%)                  Admit Wt: Drug Dosing Weight  Height (cm): 157.48 (31 Aug 2017 05:23)  Weight (kg): 53 (31 Aug 2017 05:23)  BMI (kg/m2): 21.4 (31 Aug 2017 05:23)  BSA (m2): 1.52 (31 Aug 2017 05:23)    Allergies: streptomycin (Unknown)      SOCIAL HISTORY:  Smoker: [ ] Yes  [ x] No        PACK YEARS:                         WHEN QUIT?  ETOH use: [ ] Yes  [x ] No              FREQUENCY / QUANTITY:  Ilicit Drug use:  [ ] Yes  [ x] No      Relevant Family History  FAMILY HISTORY:  Family history of diabetes mellitus (Grandparent): grand mother      Review of Systems  GENERAL:  no weakness, fatigue, fevers or chills  NEURO:  hx dementia  SKIN: no itching, burning, rashes, or lesions   HEENT: no visual changes;  no headache, no vertigo, no recent colds  RESPIRATORY: no shortness of breath, no cough, sputum, wheezing, hemoptysis;   CARDIOVASCULAR:  no chest pain,  or palpitations  GI: no abdominal or epigastric pain. no heartburn, nausea, vomiting, or hematemesis; no diarrhea or constipation. no melena  PERIPHERAL VASCULAR: no swelling, no tenderness, no erythema, no intermittent claudication, leg cramps, no varicose veins     PHYSICAL EXAM-  DESIREE  General: Well nourished, well developed, NAD.                                              Neuro: DESIREE.                    Eyes: Normal exam of conjunctiva & lids, pupils equally reactive.   ENT: Normal exam of nasal/oral mucosa with absence of cyanosis.   Neck: Normal exam of jugular veins, trachea & thyroid.   Chest: Normal lung exam with good air movement absence of wheezes, rales, or rhonchi:                                                                          CV:  Auscultation:    Carotids: No Bruits[ ]  Abdominal Aorta: normal [ ] nonpalpable[ ]                                                                         GI: Normal exam of abdomen with no noted masses or tenderness. +BSx4Q                                                                                            Extremities: Normal no evidence of cyanosis or deformity, Edema:   Lower Extremity Pulses: Right[ ] Left[ ]Varicosities[ ]  SKIN : Normal exam to inspection & palpation.                                                           LABS:                        12.0   5.9   )-----------( 169      ( 05 Sep 2017 06:46 )             37.4     09-05    137  |  98  |  28<H>  ----------------------------<  145<H>  5.7<H>   |  14<L>  |  1.98<H>    Ca    9.4      05 Sep 2017 06:46  Phos  6.9     09-05  Mg     2.2     09-05    TPro  7.3  /  Alb  3.4  /  TBili  1.7<H>  /  DBili  x   /  AST  109<H>  /  ALT  76<H>  /  AlkPhos  205<H>  09-05    PT/INR - ( 05 Sep 2017 06:46 )   PT: 14.3 sec;   INR: 1.30 ratio         PTT - ( 05 Sep 2017 06:46 )  PTT:104.2 sec      Cardiac Cath:    TTE / MEJIA:< from: TTE with Doppler (w/Cont) (09.05.17 @ 07:13) >  Severe segmental left ventricular systolic dysfunction.  The mid to apical inferoseptal, lateral, anteroseptal,  anterior and inferior walls are akinetic and somewhat  aneurysmal. There is a defect seen in the apical  inferoseptum that may represent a muscular VSD. Suggest  repeat echo with Doppler of the mid to distal septum.  2. Right ventricular enlargement with decreased right  ventricular systolic function.  3. Trace pericardial effusion.  *** Compared with echocardiogram of 9/1/2017, above  findings of possible VSD are new. Findings discussed with  Dr. Cordero at 930 on 9/5/17.  ------------------------------------------------------------------------    < end of copied text >  < from: TTE with Doppler (w/Cont) (09.05.17 @ 07:13) >  Severe segmental left ventricular systolic dysfunction.  The mid to apical inferoseptal, lateral, anteroseptal,  anterior and inferior walls are akinetic and somewhat  aneurysmal. There is a defect seen in the apical  inferoseptum that may represent a muscular VSD. Suggest  repeat echo with Doppler of the mid to distal septum.  2. Right ventricular enlargement with decreased right  ventricular systolic function.  3. Trace pericardial effusion.  *** Compared with echocardiogram of 9/1/2017, above  findings of possible VSD are new. Findings discussed with  Dr. Cordero at 930 on 9/5/17.  ------------------------------------------------------------------------      Plan  Hold Plavix this afternoon   P2y12  stat , NPO evaluate films and labs History of Present Illness:  HPI:   This is an 82 F presented to ED on 8/31 PMH  htn, dm2 (Diet Controlled), hypothyroidism, mild dementia. Presented with L sided chest pain , q waves and LYNDA precordial leads,  signs of HF, loaded w/ ASA/brilinta, hep started in ED, BB given, also given 500 cc for hypotension and admitted to CCU. 8/31. Not candidate for Cath/PCI due to falls and dementia history will medical management     In the CCU patient had episodes of NSVT (polymorphic) for a brief period which resolved.  transferred to floor. Pt experienced decompensation  RRT on 9/5 transferred to CCU TTE - Compared with echocardiogram of 9/1/2017,   findings of possible VSD are new. Patient on double pressors swan ruma catheter  in place Cardiac surgery consulted called        CT Surgery consulted for VSD seen on TTE 9/5 Compared with echocardiogram of 9/1/2017,   findings of possible VSD are new.     Past Medical History  Diabetes  Hypothyroidism  HTN (hypertension)      Past Surgical History  S/P tonsillectomy  H/O abdominal hysterectomy  No significant past surgical history      MEDICATIONS  (STANDING):  aspirin enteric coated 81 milliGRAM(s) Oral daily  levothyroxine 25 MICROGram(s) Oral daily  prochlorperazine   IVPB 5 milliGRAM(s) IV Intermittent once  ondansetron Injectable 4 milliGRAM(s) IV Push once  sodium chloride 0.9% Bolus 1000 milliLiter(s) IV Bolus once  vasopressin Infusion 0.02 Unit(s)/Min (1.2 mL/Hr) IV Continuous <Continuous>    MEDICATIONS  (PRN):  guaiFENesin   Syrup  (Sugar-Free) 100 milliGRAM(s) Oral every 6 hours PRN Cough    Vital Signs Last 24 Hrs  T(C): 36.6 (09-05-17 @ 05:14), Max: 36.7 (09-04-17 @ 14:43)  T(F): 97.9 (09-05-17 @ 05:14), Max: 98.1 (09-04-17 @ 14:43)  HR: 107 (09-05-17 @ 05:14) (95 - 114)  BP: 128/53 (09-05-17 @ 05:14) (90/62 - 128/53)  RR: 20 (09-05-17 @ 05:14) (18 - 24)  SpO2: 95% (09-05-17 @ 05:14) (94% - 99%)                  Admit Wt: Drug Dosing Weight  Height (cm): 157.48 (31 Aug 2017 05:23)  Weight (kg): 53 (31 Aug 2017 05:23)  BMI (kg/m2): 21.4 (31 Aug 2017 05:23)  BSA (m2): 1.52 (31 Aug 2017 05:23)    Allergies: streptomycin (Unknown)      SOCIAL HISTORY:  Smoker: [ ] Yes  [ x] No        PACK YEARS:                         WHEN QUIT?  ETOH use: [ ] Yes  [x ] No              FREQUENCY / QUANTITY:  Ilicit Drug use:  [ ] Yes  [ x] No      Relevant Family History  FAMILY HISTORY:  Family history of diabetes mellitus (Grandparent): grand mother      Review of Systems  GENERAL:  , fatigue, falls  or chills  NEURO:  hx dementia  SKIN: no itching, burning, rashes, or lesions   HEENT: no visual changes;  no headache, no vertigo, no recent colds  RESPIRATORY: no shortness of breath, no cough, sputum, wheezing, hemoptysis;   CARDIOVASCULAR:  no chest pain,  or palpitations  GI: no abdominal or epigastric pain. no heartburn, nausea, vomiting, or hematemesis; no diarrhea or constipation. no melena  PERIPHERAL VASCULAR: no swelling, no tenderness, no erythema, no intermittent claudication, leg cramps, no varicose veins     PHYSICAL EXAM-   General: lethargic s/p RRT                                              Neuro: "hello'                    Eyes: Normal exam of conjunctiva & lids, pupils equally reactive.   ENT: Normal exam of nasal/oral mucosa with absence of cyanosis.   Neck: Normal exam of jugular veins, trachea & thyroid RT IJ SWAN inplace.   Chest:  B/L breath sounds                                                                        CV:  Auscultation: RRR S1S2 2/6   Carotids: No Bruits[ x]  Abdominal Aorta: normal [ x] nonpalpable[ ]                                                                         GI: Normal exam of abdomen with no noted masses or tenderness. +BSx4Q                                                                                            Extremities: Normal no evidence of cyanosis or deformity, Edema:   Lower Extremity Pulses: Right[x ] Left[ x]Varicosities[ -]  SKIN : Normal exam to inspection & palpation.                                                           LABS:                        12.0   5.9   )-----------( 169      ( 05 Sep 2017 06:46 )             37.4     09-05    137  |  98  |  28<H>  ----------------------------<  145<H>  5.7<H>   |  14<L>  |  1.98<H>    Ca    9.4      05 Sep 2017 06:46  Phos  6.9     09-05  Mg     2.2     09-05    TPro  7.3  /  Alb  3.4  /  TBili  1.7<H>  /  DBili  x   /  AST  109<H>  /  ALT  76<H>  /  AlkPhos  205<H>  09-05    PT/INR - ( 05 Sep 2017 06:46 )   PT: 14.3 sec;   INR: 1.30 ratio         PTT - ( 05 Sep 2017 06:46 )  PTT:104.2 sec      Cardiac Cath:    TTE / MEJIA:< from: TTE with Doppler (w/Cont) (09.05.17 @ 07:13) >  Severe segmental left ventricular systolic dysfunction.  The mid to apical inferoseptal, lateral, anteroseptal,  anterior and inferior walls are akinetic and somewhat  aneurysmal. There is a defect seen in the apical  inferoseptum that may represent a muscular VSD. Suggest  repeat echo with Doppler of the mid to distal septum.  2. Right ventricular enlargement with decreased right  ventricular systolic function.  3. Trace pericardial effusion.  *** Compared with echocardiogram of 9/1/2017, above  findings of possible VSD are new. Findings discussed with  Dr. Cordero at 930 on 9/5/17.  ------------------------------------------------------------------------    < end of copied text >  < from: TTE with Doppler (w/Cont) (09.05.17 @ 07:13) >  Severe segmental left ventricular systolic dysfunction.  The mid to apical inferoseptal, lateral, anteroseptal,  anterior and inferior walls are akinetic and somewhat  aneurysmal. There is a defect seen in the apical  inferoseptum that may represent a muscular VSD. Suggest  repeat echo with Doppler of the mid to distal septum.  2. Right ventricular enlargement with decreased right  ventricular systolic function.  3. Trace pericardial effusion.  *** Compared with echocardiogram of 9/1/2017, above  findings of possible VSD are new. Findings discussed with  Dr. Cordero at 930 on 9/5/17.  ------------------------------------------------------------------------      Plan  P2y12, NPO, evaluate films and labs

## 2017-09-05 NOTE — CHART NOTE - NSCHARTNOTEFT_GEN_A_CORE
Per CTSx pt is not a candidate for repair of VSR. Spoke w/ family at bedside (daughter: Jayla). Per family wishes - DNR/DNI, no escalation or care, cap pressors. No further invasive procedures. Pt actively dying.

## 2017-09-05 NOTE — PROVIDER CONTACT NOTE (OTHER) - SITUATION
Patient is complaining of pain below her left breast
Auscultated with wheezing
Heparin bag is unable to scan
Patient complaining of 5/10 back pain
Pt had an unwitnessed fall at home, no CT scan was done, pt in on heparin drip

## 2017-09-05 NOTE — PROGRESS NOTE ADULT - PROBLEM SELECTOR PLAN 1
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

## 2022-07-02 NOTE — ED ADULT TRIAGE NOTE - RESPIRATORY RATE (BREATHS/MIN)
DISPLAY PLAN FREE TEXT DISPLAY PLAN FREE TEXT DISPLAY PLAN FREE TEXT DISPLAY PLAN FREE TEXT DISPLAY PLAN FREE TEXT 20 DISPLAY PLAN FREE TEXT DISPLAY PLAN FREE TEXT DISPLAY PLAN FREE TEXT DISPLAY PLAN FREE TEXT DISPLAY PLAN FREE TEXT DISPLAY PLAN FREE TEXT DISPLAY PLAN FREE TEXT DISPLAY PLAN FREE TEXT

## 2024-04-01 NOTE — H&P ADULT - ATTENDING COMMENTS
April 2, 2024             Dear Roni Magdaleno:    Welcome to [facility] Complex Care Management [facility].  It was a pleasure talking with you today.  My name is Adele Medina RN CCM  and I look forward to being your Care Manager.  My goal is to help you function at the healthiest and highest level possible.  You can contact me directly at [phone].    As an [facility] patient, some of the services we may be able to provide include:     Development of an individualized care plan with a Registered Nurse   Connection with a   Connection with available resources and services    Coordinate communication among your care team members   Provide coaching and education   Help you understand your doctors treatment plan  Help you obtain information about your insurance coverage.     All services provided by [facility] Complex Care Managers and other care team members are coordinated with and communicated to your primary care team.      As part of your enrollment, you will be receiving education materials and more information about these services in your [facility] account, by phone or through the mail.  If you do not wish to participate or receive information, please contact our office at [phone].      Sincerely,        Adele Medina RN CCM [facility]   Out-patient RN Complex Care Manager        Patient was seen and examined with CCU team. I agree with findings and plan. CT head

## 2025-02-25 NOTE — PROGRESS NOTE ADULT - PROBLEM/PLAN-2
Problem: Safety - Adult  Goal: Free from fall injury  2/25/2025 0031 by Jay Partida RN  Outcome: Progressing  2/24/2025 1848 by Lyndsay Barraza RN  Outcome: Progressing  Flowsheets (Taken 2/24/2025 1230)  Free From Fall Injury:   Instruct family/caregiver on patient safety   Based on caregiver fall risk screen, instruct family/caregiver to ask for assistance with transferring infant if caregiver noted to have fall risk factors     Problem: Chronic Conditions and Co-morbidities  Goal: Patient's chronic conditions and co-morbidity symptoms are monitored and maintained or improved  2/25/2025 0031 by Jay Partida RN  Outcome: Progressing  2/24/2025 1848 by Lyndsay Barraza RN  Outcome: Progressing  Flowsheets (Taken 2/24/2025 0750)  Care Plan - Patient's Chronic Conditions and Co-Morbidity Symptoms are Monitored and Maintained or Improved:   Monitor and assess patient's chronic conditions and comorbid symptoms for stability, deterioration, or improvement   Collaborate with multidisciplinary team to address chronic and comorbid conditions and prevent exacerbation or deterioration   Update acute care plan with appropriate goals if chronic or comorbid symptoms are exacerbated and prevent overall improvement and discharge     Problem: Discharge Planning  Goal: Discharge to home or other facility with appropriate resources  2/25/2025 0031 by Jay Partida RN  Outcome: Progressing  2/24/2025 1848 by Lyndsay Barraza RN  Outcome: Progressing  Flowsheets (Taken 2/24/2025 0750)  Discharge to home or other facility with appropriate resources:   Identify barriers to discharge with patient and caregiver   Arrange for needed discharge resources and transportation as appropriate   Identify discharge learning needs (meds, wound care, etc)     Problem: Skin/Tissue Integrity  Goal: Skin integrity remains intact  Description: 1.  Monitor for areas of redness and/or skin breakdown  2.  Assess vascular access sites  DISPLAY PLAN FREE TEXT